# Patient Record
Sex: MALE | Race: WHITE | NOT HISPANIC OR LATINO | Employment: OTHER | URBAN - METROPOLITAN AREA
[De-identification: names, ages, dates, MRNs, and addresses within clinical notes are randomized per-mention and may not be internally consistent; named-entity substitution may affect disease eponyms.]

---

## 2017-12-14 ENCOUNTER — RECORDS - HEALTHEAST (OUTPATIENT)
Dept: LAB | Facility: CLINIC | Age: 57
End: 2017-12-14

## 2017-12-15 LAB — HBA1C MFR BLD: 5.5 % (ref 4.2–6.1)

## 2018-07-09 ENCOUNTER — RECORDS - HEALTHEAST (OUTPATIENT)
Dept: ADMINISTRATIVE | Facility: OTHER | Age: 58
End: 2018-07-09

## 2021-09-02 ENCOUNTER — LAB REQUISITION (OUTPATIENT)
Dept: LAB | Facility: CLINIC | Age: 61
End: 2021-09-02
Payer: COMMERCIAL

## 2021-09-02 DIAGNOSIS — R73.09 OTHER ABNORMAL GLUCOSE: ICD-10-CM

## 2021-09-02 LAB
ALBUMIN SERPL-MCNC: 3.2 G/DL (ref 3.5–5)
ALP SERPL-CCNC: 69 U/L (ref 45–120)
ALT SERPL W P-5'-P-CCNC: 23 U/L (ref 0–45)
ANION GAP SERPL CALCULATED.3IONS-SCNC: 11 MMOL/L (ref 5–18)
AST SERPL W P-5'-P-CCNC: 16 U/L (ref 0–40)
BILIRUB SERPL-MCNC: 0.5 MG/DL (ref 0–1)
BUN SERPL-MCNC: 21 MG/DL (ref 8–22)
CALCIUM SERPL-MCNC: 9.1 MG/DL (ref 8.5–10.5)
CHLORIDE BLD-SCNC: 106 MMOL/L (ref 98–107)
CHOLEST SERPL-MCNC: 167 MG/DL
CO2 SERPL-SCNC: 28 MMOL/L (ref 22–31)
CREAT SERPL-MCNC: 0.79 MG/DL (ref 0.7–1.3)
GFR SERPL CREATININE-BSD FRML MDRD: >90 ML/MIN/1.73M2
GLUCOSE BLD-MCNC: 122 MG/DL (ref 70–125)
HDLC SERPL-MCNC: 36 MG/DL
LDLC SERPL CALC-MCNC: 114 MG/DL
POTASSIUM BLD-SCNC: 4.1 MMOL/L (ref 3.5–5)
PROT SERPL-MCNC: 6.7 G/DL (ref 6–8)
SODIUM SERPL-SCNC: 145 MMOL/L (ref 136–145)
TRIGL SERPL-MCNC: 86 MG/DL

## 2021-09-02 PROCEDURE — 80061 LIPID PANEL: CPT | Mod: ORL | Performed by: FAMILY MEDICINE

## 2021-09-02 PROCEDURE — 80053 COMPREHEN METABOLIC PANEL: CPT | Mod: ORL | Performed by: FAMILY MEDICINE

## 2021-09-29 ENCOUNTER — LAB REQUISITION (OUTPATIENT)
Dept: LAB | Facility: CLINIC | Age: 61
End: 2021-09-29
Payer: COMMERCIAL

## 2021-09-29 DIAGNOSIS — Z03.818 ENCOUNTER FOR OBSERVATION FOR SUSPECTED EXPOSURE TO OTHER BIOLOGICAL AGENTS RULED OUT: ICD-10-CM

## 2021-09-29 PROCEDURE — U0003 INFECTIOUS AGENT DETECTION BY NUCLEIC ACID (DNA OR RNA); SEVERE ACUTE RESPIRATORY SYNDROME CORONAVIRUS 2 (SARS-COV-2) (CORONAVIRUS DISEASE [COVID-19]), AMPLIFIED PROBE TECHNIQUE, MAKING USE OF HIGH THROUGHPUT TECHNOLOGIES AS DESCRIBED BY CMS-2020-01-R: HCPCS | Mod: ORL | Performed by: FAMILY MEDICINE

## 2021-09-30 LAB — SARS-COV-2 RNA RESP QL NAA+PROBE: NEGATIVE

## 2021-12-20 ENCOUNTER — PATIENT OUTREACH (OUTPATIENT)
Dept: CARE COORDINATION | Facility: CLINIC | Age: 61
End: 2021-12-20
Payer: COMMERCIAL

## 2021-12-20 NOTE — LETTER
[facility]   Patient Centered Plan of Care  About Me:        Patient Name:  Stephan Montanez    YOB: 1960  Age:         61 year old   [place] MRN:    [id number] Telephone Information:  Home Phone [phone]   Mobile Not on file.       Address:  [address] [place] [address] Email address:  No e-mail address on record      Emergency Contact(s)    Name Relationship Lgl Grd Work Phone Home Phone Mobile Phone   1. JOSÉ PAULA Significant ot*    [phone]           Primary language:  English     needed? No   [place] [employer]:  [phone] op. 1  Other communication barriers:None    Preferred Method of Communication:     Current living arrangement: I live in a private home with family    Mobility Status/ Medical Equipment: Dependent/Assisted by Another        Health Maintenance  Health Maintenance Reviewed: Up to date      My Access Plan  Medical Emergency 911   Primary Clinic Line [facility] - [phone]   24 Hour Appointment Line [phone] or  [phone]) (toll-free)   24 Hour Nurse Line [phone] (toll-free)   Preferred Urgent Care Other     Preferred Hospital Other     Preferred Pharmacy No Pharmacies Listed   Behavioral Health Crisis Line The National Suicide Prevention Lifeline at 1-939.203.7575 or 911             My Care Team Members  Patient Care Team       Relationship Specialty Notifications Start End    Juan Martinez MD PCP - General Family Medicine  1/3/22     Phone: [phone] Fax: [phone]         [facility] [address] [place] [address]    Yohana Michelle LSW Lead Care Coordinator  Admissions 1/3/22             My Care Plans  Self Management and Treatment Plan  Goals and (Comments)  Goals        General     1. Assistive Service (pt-stated)      Notes - Note created  1/3/2022 10:25 AM by Yohana Michelle LSW     Goal Statement: I will apply for assistance to help with costs of  medical device assistance to be in my home to assist in meeting my needs within the next 6 months.  Date Goal set: 12/20/2021  Barriers: eligibility and availability  Strengths: strong advocate for self, family support  Date to Achieve By: 6/20/2022  Patient expressed understanding of goal: yes  Action steps to achieve this goal:  1. [place] to [place] if I am eligible for assistance in getting medical equipment in my house to assist in meeting my needs.   2. I will apply for financial assistance to see if I am eligible to get assistance for medical equipment to better meet my needs.   3. I will contact the  SW with any questions or concerns.               Action Plans on File:                       Advance Care Plans/Directives Type:   No data recorded    My Medical and Care Information  Problem List   There is no problem list on file for this patient.     Current Medications and Allergies:  See printed Medication Report.    Care Coordination Start Date: 12/20/2021   Frequency of Care Coordination: monthly     Form Last Updated: 01/03/2022

## 2021-12-20 NOTE — LETTER
[facility] CARE COORDINATION    January 3, 2022    Stephan Montanez  [address] [place] [address]      Dear Stephan,    I am a clinic care coordinator who works with Juan Martinez MD at [facility]. I wanted to thank you for spending the time to talk with me.  Below is a description of clinic care coordination and how I can further assist you.      The clinic care coordination team is made up of a registered nurse,  and community health worker who understand the health care system. The goal of clinic care coordination is to help you manage your health and improve access to the health care system in the most efficient manner. The team can assist you in meeting your health care goals by providing education, coordinating services, strengthening the communication among your providers and supporting you with any resource needs.    Please feel free to contact me at [phone] with any questions or concerns. We are focused on providing you with the highest-quality healthcare experience possible and that all starts with you.     Sincerely,     Yohana Michelle [facility]  Clinic Care Coordinator  [facility]     Enclosed: I have enclosed a copy of the Patient Centered Plan of Care. This has helpful information and goals that we have talked about. Please keep this in an easy to access place to use as needed.    Resources:

## 2022-01-03 ASSESSMENT — ACTIVITIES OF DAILY LIVING (ADL): DEPENDENT_IADLS:: TRANSPORTATION

## 2022-01-03 NOTE — PROGRESS NOTES
Clinic Care Coordination Contact    Clinic Care Coordination Contact  OUTREACH    Referral Information:  Referral Source: Care Team    Primary Diagnosis: Psychosocial    Chief Complaint   Patient presents with     Clinic Care Coordination - Initial        Universal Utilization:   Clinic Utilization: [facility]  Difficulty keeping appointments:: Yes  Compliance Concerns: No  No-Show Concerns: Yes  No PCP office visit in Past Year: No  Utilization    Hospital Admissions  0             ED Visits  0             No Show Count (past year)  1                Current as of: 12/30/2021  3:02 AM              Clinical Concerns:  Current Medical Concerns: BEN ENGLE met with the pt in clinic to discuss if the pt would like any assistance with resources. Pt was hesitant at first with the role of the SW and if they were [place] into the county role, concerns over being able to stay in his home or not. BEN ENGLE explained the role of the CC KADIE and the assistance being offered. Pt and the pt's significant other/caregiver stated it would be nice to have assistance with in home supports for the shower/bathroom and around the house to make it easier for the pt to get around and have the pt's needs met more fully.    Current Behavioral Concerns: n/a    Education Provided to patient: BEN ENGLE met and spoke with patient; introduced self, discussed role of Care Coordination, and explained reason for call.   Pain  Pain (GOAL):: No  Health Maintenance Reviewed: Up to date  Clinical Pathway: None    Medication Management:  Medication review status: did not discuss    Functional Status:  Dependent ADLs:: Wheelchair-with assist  Dependent IADLs:: Transportation  Bed or wheelchair confined:: No  Mobility Status: Dependent/Assisted by Another  Fallen 2 or more times in the past year?: No  Any fall with injury in the past year?: No    Living Situation:  Current living arrangement:: I live in a private home with family  Type of residence::  Private home - stairs    Lifestyle & Psychosocial Needs:    Social Determinants of Health     Tobacco Use: Not on file   Alcohol Use: Not on file   Financial Resource Strain: Not on file   Food Insecurity: Not on file   Transportation Needs: Not on file   Physical Activity: Not on file   Stress: Not on file   Social Connections: Not on file   Intimate Partner Violence: Not on file   Depression: Not on file   Housing Stability: Not on file     Diet:: Regular  Inadequate nutrition (GOAL):: Yes  Tube Feeding: No  Inadequate activity/exercise (GOAL):: Yes  Significant changes in sleep pattern (GOAL): No  Transportation means:: Accessible car,Family     Confucianist or spiritual beliefs that impact treatment:: No  Mental health DX:: No  Mental health management concern (GOAL):: No  Chemical Dependency Status: No Current Concerns  Informal Support system:: Significant other,Partner          Resources and Interventions:  Current Resources:      Community Resources: None  Supplies used at home:: None  Equipment Currently Used at Home: wheelchair, manual  Employment Status: self-employed         Advance Care Plan/Directive  Advanced Care Plans/Directives on file:: No  Advanced Care Plan/Directive Status: Not Applicable    Referrals Placed: Community Resources     Goals:   Goals        General     1. Assistive Service (pt-stated)      Notes - Note created  1/3/2022 10:25 AM by Yohana Michelle LSW     Goal Statement: I will apply for assistance to help with costs of medical device assistance to be in my home to assist in meeting my needs within the next 6 months.  Date Goal set: 12/20/2021  Barriers: eligibility and availability  Strengths: strong advocate for self, family support  Date to Achieve By: 6/20/2022  Patient expressed understanding of goal: yes  Action steps to achieve this goal:  1. [place] to [place] if I am eligible for assistance in getting medical equipment in my house to assist in  meeting my needs.   2. I will apply for financial assistance to see if I am eligible to get assistance for medical equipment to better meet my needs.   3. I will contact the CC SW with any questions or concerns.              Patient/Caregiver understanding: Patient verbalized understanding, engaged in AIDET communication during patient encounter.    Outreach Frequency: monthly      Plan: BEN ENGLE gave the pt and his significant other resources when the pt was in clinic. CC KADIE will mail out additional resources and information to the pt. Pt to contact the CC KADIE with any questions or concerns. BEN ENGLE will outreach to the pt in 1 month to follow up.     [facility]  Clinic Care Coordinator  [facility]   [phone]

## 2022-02-11 ENCOUNTER — PATIENT OUTREACH (OUTPATIENT)
Dept: CARE COORDINATION | Facility: CLINIC | Age: 62
End: 2022-02-11

## 2022-03-09 NOTE — PROGRESS NOTES
Clinic Care Coordination Contact    Follow Up Progress Note      Assessment: CC SW spoke with pt. Pt hadn't followed up on resources yet but was looking at them. Pt did follow up with PCP on swimming lessons and therapy for himself. Pt was very interested in this and had a therapy session set up for 2 weeks out.     Care Gaps:    Health Maintenance Due   Topic Date Due     PREVENTIVE CARE VISIT  Never done     ADVANCE CARE PLANNING  Never done     COLORECTAL CANCER SCREENING  Never done     HIV SCREENING  Never done     HEPATITIS C SCREENING  Never done     ZOSTER IMMUNIZATION (1 of 2) Never done     INFLUENZA VACCINE (1) Never done     COVID-19 Vaccine (2 - Pfizer 3-dose series) 09/23/2021     PHQ-2 (once per calendar year)  Never done       Currently there are no Care Gaps.    Goals addressed this encounter:   Goals Addressed                    This Visit's Progress       1. Assistive Service (pt-stated)   10%      Goal Statement: I will apply for assistance to help with costs of medical device assistance to be in my home to assist in meeting my needs within the next 6 months.  Date Goal set: 12/20/2021  Barriers: eligibility and availability  Strengths: strong advocate for self, family support  Date to Achieve By: 6/20/2022  Patient expressed understanding of goal: yes  Action steps to achieve this goal:  1. [place] to [place] if I am eligible for assistance in getting medical equipment in my house to assist in meeting my needs.   2. I will apply for financial assistance to see if I am eligible to get assistance for medical equipment to better meet my needs.   3. I will contact the CC SW with any questions or concerns.              Intervention/Education provided during outreach: Patient verbalized understanding, engaged in AIDET communication during patient encounter.       Outreach Frequency: monthly    Plan:   Pt to follow up with resources.   Care Coordinator will follow up in 1  month.    MARCIA Jackson  Social Work Care Coordinator - [facility]  Care Coordination  [email or web address]  Cell Phone: [phone]  Gender pronouns: she/her  Employed by [employer]

## 2022-04-21 ENCOUNTER — PATIENT OUTREACH (OUTPATIENT)
Dept: CARE COORDINATION | Facility: CLINIC | Age: 62
End: 2022-04-21

## 2022-05-26 NOTE — PROGRESS NOTES
Clinic Care Coordination Contact    Follow Up Progress Note      Assessment: CC KADIE spoke with the pt to follow up. Pt stated that his swimming lessons and his therapy are going really good for him. Pt stated that he hasn't applied for assistance, but other then that things are well. Pt stated that he has started on some gummies, and that is going really well.     Care Gaps:    Health Maintenance Due   Topic Date Due     PREVENTIVE CARE VISIT  Never done     ADVANCE CARE PLANNING  Never done     COLORECTAL CANCER SCREENING  Never done     HIV SCREENING  Never done     HEPATITIS C SCREENING  Never done     ZOSTER IMMUNIZATION (1 of 2) Never done     COVID-19 Vaccine (2 - Pfizer series) 09/23/2021     PHQ-2 (once per calendar year)  Never done       Currently there are no Care Gaps.    Goals addressed this encounter:    Goals Addressed                    This Visit's Progress       1. Assistive Service (pt-stated)   20%      Goal Statement: I will apply for assistance to help with costs of medical device assistance to be in my home to assist in meeting my needs within the next 6 months.  Date Goal set: 12/20/2021  Barriers: eligibility and availability  Strengths: strong advocate for self, family support  Date to Achieve By: 6/20/2022  Patient expressed understanding of goal: yes  Action steps to achieve this goal:  1. [place] if I am eligible for assistance in getting medical equipment in my house to assist in meeting my needs.   2. I will apply for financial assistance to see if I am eligible to get assistance for medical equipment to better meet my needs.   3. I will contact the CC KADIE with any questions or concerns.                Intervention/Education provided during outreach: Patient verbalized understanding, engaged in AIDET communication during patient encounter.     Outreach Frequency: 2 months      Plan:   BEN ENGLE to follow up in 2 months.     MARCIA Jackson  Social  Work Care Coordinator - [facility]  Care Coordination  [email or web address]  Cell Phone: [phone]  Gender pronouns: she/her  Employed by [employer]

## 2022-07-06 ENCOUNTER — PATIENT OUTREACH (OUTPATIENT)
Dept: CARE COORDINATION | Facility: CLINIC | Age: 62
End: 2022-07-06

## 2022-07-06 NOTE — PROGRESS NOTES
Clinic Care Coordination Contact    Follow Up Progress Note      Assessment: KADIE CC called and outreached to Stephan, he states that he is doing okay. He hurt his shoulder yesterday by putting up a pool in the yard. He is trying to give his arm/shoulder a break. KADIE CC inquired if he needs anything regarding any social work needs and if [place]. He states that he has not yet done so and still is interested in completing that for equipment. He asked that KADIE CC call him in 2 weeks to check in rather than a month.    Care Gaps:    Health Maintenance Due   Topic Date Due     PREVENTIVE CARE VISIT  Never done     ADVANCE CARE PLANNING  Never done     COLORECTAL CANCER SCREENING  Never done     HIV SCREENING  Never done     HEPATITIS C SCREENING  Never done     ZOSTER IMMUNIZATION (1 of 2) Never done     COVID-19 Vaccine (2 - Pfizer series) 09/23/2021     PHQ-2 (once per calendar year)  Never done     Goals addressed this encounter:    Goals Addressed                    This Visit's Progress       1. Assistive Service (pt-stated)   0%      Goal Statement: I will apply for assistance to help with costs of medical device assistance to be in my home to assist in meeting my needs within the next 6 months.  Date Goal set: 12/20/2021  Barriers: eligibility and availability  Strengths: strong advocate for self, family support  Date to Achieve By: 6/20/2022  Patient expressed understanding of goal: yes  Action steps to achieve this goal:  1. [place] if I am eligible for assistance in getting medical equipment in my house to assist in meeting my needs.   2. I will apply for financial assistance to see if I am eligible to get assistance for medical equipment to better meet my needs.   3. I will contact the CC SW with any questions or concerns.                Intervention/Education provided during outreach: KADIE CONTRERAS role     Outreach Frequency: 2 months      Plan:  Care Coordinator will follow up in 2 weeks per request of client.    MARCIA Mendez for MARCIA Jackson  , Care Coordination  [facility]  [facility]  [facility]  [phone]

## 2022-08-29 ENCOUNTER — PATIENT OUTREACH (OUTPATIENT)
Dept: CARE COORDINATION | Facility: CLINIC | Age: 62
End: 2022-08-29

## 2022-08-29 NOTE — PROGRESS NOTES
Clinic Care Coordination Contact  Care Team Conversations    Clinic Care Coordination - Chart Review Only    Situation/Background: Patient chart reviewed by care coordinator related to [employer] conversion.    Assessment: Patient continues to be followed by Clinic Care Coordination.    Plan: Patient's chart updated to align with [employer] program for ongoing patient management.    MARCIA Jackson  Social Work Care Coordinator - [facility]  Care Coordination  [email or web address]  Cell Phone: [phone]  Gender pronouns: she/her  Employed by [employer]

## 2022-10-11 ENCOUNTER — PATIENT OUTREACH (OUTPATIENT)
Dept: CARE COORDINATION | Facility: CLINIC | Age: 62
End: 2022-10-11

## 2022-10-11 NOTE — PROGRESS NOTES
Clinic Care Coordination Contact    Follow Up Progress Note      Assessment: BEN ENGLE followed up with the pt for his monthly outreach. Pt stated that things were going. Pt has an appointment this afternoon with his provider for ongoing knee pain. Pt stated that he has not been doing PT as he isn't sure about that at this time. BEN ENGLE brought up a previous conversation the provider had with the pt about wanting to get into the gym, and the provider had suggested going through PT, but the pt stated he was still unsure about that. Pt stated that things are going well. BEN SW did follow up on financial assistance and assistive devices in the home, but the pt stated things were okay right now. Pt did state he would prefer to have 2 week check ins. BEN KADIE agreed to this and scheduled a follow up for 2 weeks out.     Care Gaps:    Health Maintenance Due   Topic Date Due     YEARLY PREVENTIVE VISIT  Never done     ADVANCE CARE PLANNING  Never done     HEPATITIS B IMMUNIZATION (1 of 3 - 3-dose series) Never done     COLORECTAL CANCER SCREENING  Never done     HIV SCREENING  Never done     HEPATITIS C SCREENING  Never done     ZOSTER IMMUNIZATION (1 of 2) Never done     COVID-19 Vaccine (2 - Pfizer series) 09/23/2021     PHQ-2 (once per calendar year)  Never done     INFLUENZA VACCINE (1) Never done       Currently there are no Care Gaps.    Care Plans  Care Plan: Help At Home SW Only     Problem: Insufficient In-home support     Goal: Establish adequate in-home support (including equipment)     Start Date: 10/11/2022 Expected End Date: 4/11/2023    Note:     Goal Statement: I will apply for assistance to help with costs of medical device assistance to be in my home to assist in meeting my needs within the next 6 months.  Barriers: eligibility and availability  Strengths: strong advocate for self, family support  Patient expressed understanding of goal: yes  Action steps to achieve this goal:  1. [place] to see if I am eligible for assistance in getting medical equipment in my house to assist in meeting my needs.   2. I will apply for financial assistance to see if I am eligible to get assistance for medical equipment to better meet my needs.   3. I will contact the CC SW with any questions or concerns.                          Intervention/Education provided during outreach: Patient verbalized understanding, engaged in AIDET communication during patient encounter.       Outreach Frequency: 2 weeks      Plan:   Pt to attend his appointment today for his ongoing knee pain. Pt to contact the CC SW with any questions or concerns.   Care Coordinator will follow up in 2 weeks.     Yohana Michelle [facility]  Social Work Care Coordinator - [facility]  Care Coordination  [email or web address]  Cell Phone: [phone]  Gender pronouns: she/her  Employed by [employer]

## 2023-01-26 ENCOUNTER — PATIENT OUTREACH (OUTPATIENT)
Dept: CARE COORDINATION | Facility: CLINIC | Age: 63
End: 2023-01-26
Payer: COMMERCIAL

## 2023-01-26 NOTE — PROGRESS NOTES
Clinic Care Coordination Contact  Care Team Conversations    CC KADIE followed up with the pt and checked in. PT stated that he was doing well and things were good at home. CC KADIE   Stated that due to prior conversations of pt unsure of supports and not sure if they were wanting them, CC KADIE was closing out, but if he had any needs come up or he decided that he wanted some help with resources and supports to let Michelle, his PCP and or myself know and I would be glad to assist him. Pt was happy and stated he would remember that and give me a call if things came up.     Yohana Michelle, [facility]  Social Work Care Coordinator - [facility]  Care Coordination  [email or web address]  Cell Phone: [phone]  Gender pronouns: she/her  Employed by [employer]

## 2023-02-06 ENCOUNTER — VIRTUAL VISIT (OUTPATIENT)
Dept: PHARMACY | Facility: PHYSICIAN GROUP | Age: 63
End: 2023-02-06
Payer: COMMERCIAL

## 2023-02-06 DIAGNOSIS — G47.00 INSOMNIA, UNSPECIFIED TYPE: ICD-10-CM

## 2023-02-06 DIAGNOSIS — R06.02 SHORTNESS OF BREATH: ICD-10-CM

## 2023-02-06 DIAGNOSIS — G89.29 OTHER CHRONIC PAIN: Primary | ICD-10-CM

## 2023-02-06 DIAGNOSIS — R05.9 COUGH, UNSPECIFIED TYPE: ICD-10-CM

## 2023-02-06 DIAGNOSIS — I10 BENIGN ESSENTIAL HYPERTENSION: ICD-10-CM

## 2023-02-06 DIAGNOSIS — F41.9 ANXIETY: ICD-10-CM

## 2023-02-06 DIAGNOSIS — R60.9 EDEMA, UNSPECIFIED TYPE: ICD-10-CM

## 2023-02-06 DIAGNOSIS — I48.20 CHRONIC ATRIAL FIBRILLATION (H): ICD-10-CM

## 2023-02-06 DIAGNOSIS — R09.89 CHEST CONGESTION: ICD-10-CM

## 2023-02-06 PROCEDURE — 99605 MTMS BY PHARM NP 15 MIN: CPT | Performed by: PHARMACIST

## 2023-02-06 PROCEDURE — 99607 MTMS BY PHARM ADDL 15 MIN: CPT | Performed by: PHARMACIST

## 2023-02-06 RX ORDER — OXYCODONE AND ACETAMINOPHEN 10; 325 MG/1; MG/1
1 TABLET ORAL 3 TIMES DAILY PRN
COMMUNITY

## 2023-02-06 RX ORDER — TRAZODONE HYDROCHLORIDE 100 MG/1
100-200 TABLET ORAL AT BEDTIME
COMMUNITY

## 2023-02-06 RX ORDER — METOPROLOL SUCCINATE 200 MG/1
200 TABLET, EXTENDED RELEASE ORAL DAILY
COMMUNITY

## 2023-02-06 NOTE — PROGRESS NOTES
Medication Therapy Management (MTM) Encounter    ASSESSMENT:                            Medication Adherence/Access: No issues identified    Chronic pain: Patient's pain is not well controlled on only a fast acting opioid medication.  Would be beneficial to try switching to extended release formulation.  Patient asked to try one more formulary alternatives such as Belbuca or fentanyl patch.  Would prefer to try Belbuca due to safety concerns with fentanyl patch.  Discussed transitioning to Belbuca with clinical pharmacist at [facility] pain clinic, and he suggested considering a gradual cross taper.  Discussed this plan with Dr. Martinez and we agreed this may be the best tolerated option.  Small doses of sublingual buprenorphine, typically 0.5 mg or less (but even up to 1 mg), do not precipitate withdrawal.  Could start with lower dose reduction of oxycodone, reduce by 15 MME and add in equivalent or slightly lower dose of buprenorphine.  Then if tolerating and helping with pain could continue decreasing oxycodone by 15 MME (1 oxycodone tablet) once monthly and increasing dose of buprenorphine by similar MME.  Patient may also benefit from trying pregabalin or gabapentin, may help with sciatica component.  Could consider SNRIs that also help with pain, but will defer these options for now.  Only want to try one medication change and would like to stabilize his pain regimen on his opioid medication first.    Buprenorphine MME Equivalents:       MME Per Day   Buprenorphine       Patch   Buprenorphine      Film    9  5 mcg/hr  150 mcg BID    18  10 mcg/hr  300 mcg BID    27  15 mcg/hr  450 mcg BID    36  20 mcg/hr  600 mcg BID    45  --  750 mcg BID    54  --  900 mcg BID     Patrice GUTIERREZ, Eliana MARIN, Erin J, Sukhi A. Buprenorphine Microdosing Cross Tapers: A Time for Change. Int J Environ Res Public Health. 2022 Dec 8;19(69):52641. doi: 10.3390/bvigmy025508371. PMID: 64201824; PMCID:  AOG9939611.        Possible plan:  1.  Decrease Oxycodone-acetaminophen  mg to 1 1/2 tablets in the morning, 2 tablets midday, and 1 1/2 tablets at bedtime. Start Buprenorphine 150 mcg in the morning and at bedtime  -Dose reduction of 15 MME of oxycodone, starting 9 MME of buprenorphine    Insomnia: primarily affected by pain.  Better pain control will likely result in better quality sleep    Anxiety:  Worse as pain hasn't been well controlled.    Afib/Hypertension/Edema: Stable. Patient is meeting blood pressure goal of < 140/90mmHg.  Edema is stable.  INR has been in goal range of 2-3.    Cough/Congestion: Stable.    Shortness of breath:  Stable.    PLAN:                            Possible plan:  1.  Decrease Oxycodone-acetaminophen  mg to 1 1/2 tablets in the morning, 2 tablets midday, and 1 1/2 tablets at bedtime. Start Buprenorphine 150 mcg in the morning and at bedtime    Follow-up: Return in about 15 days (around 2/21/2023) for MTM Follow Up.    SUBJECTIVE/OBJECTIVE:                          Stephan Montanez is a 62 year old male called for an initial visit. He was referred to me from Dr. Martinez.      Reason for visit: Initial medication review.  Referral to discuss possibly switching to Belbuca for chronic pain.    Allergies/ADRs: Reviewed in chart  Past Medical History: Reviewed in chart  Tobacco: He reports that he has quit smoking. His smoking use included cigarettes. He has never used smokeless tobacco.  Alcohol: none    Medication Adherence/Access: no issues reported    Chronic pain:  Current medications include: Oxycodone  mg 2 tablets three times daily for pain. Is now having a lot of increase in pain, not as mobile due to increase in pain, sciatica pain is worse.  He only gets pain relief from oxycodone for about 3 hours, then he is in severe pain until his next dose.  Tried Morphine ER tablets, didn't work as well for pain.  His insurance requires that he tried two covered  medications before a PA for OxyContin would be considered.  He thinks he tried gabapentin in the past, unsure though.  I didn't see it in his medication history.  He was on an ER OxyContin, then they switched to Morphine, and now just on oxycodone.  The pain isn't as well controlled, they seem to wear off too quickly.  He is up a lot of night with pain  Per a fax from the pharmacy patients insurance prefers fentanyl, morphine sulfate (had tried this) and Belbuca.  MME 90    Previous medications:   MS Contin - on this for 2 months, wasn't effective in controlling his pain.  He also has constant nausea and other GI side effects  Morphine sulfate - on this for 2 months, and it was not effective in controlling his pain  Oxycodone-Acetaminophen - not effective in controlling his pain.  Needs an extended release medication that has a longer duration of action.  Patient only gets pain relief for about 3 hours with oxycodone-acetaminophen.    Insomnia: Current medications include: trazodone 100-200 mg at bedtime. He hasn't been sleeping well but primarily due to pain.  With the changes in his pain medications he wakes up a lot with pain. Patient reports trouble staying asleep and trouble falling asleep.     Anxiety:  Was using hydroxyzine 50 mg four times daily as needed for anxiety and sleep, but he didn't tolerate it.  It made his anxiety a lot worse and caused panic attacks . Has been more anxious with the increased pain.  Patient was worried I'm trying to take his pain meds away from him.  He's worried about trying a new medication, and if it doesn't work he's scared to be without pain meds.  Previous meds tried:    Escitalopram  Hydroxyzine - made anxiety worse.    Afib/Hypertension/Edema: Patient is currently taking warfarin daily per INR for anticoagulation. Patient reports no current concerns of bruising or bleeding. Patient does not have a history of GI bleed.   Patient is also taking Metoprolol  mg daily,  Torsemide 20 mg twice daily as needed (doesn't use every day, uses a few days a month). Also reports that he is taking lisinopril still although this isn't on his medication list anymore.  Patient reports no current medication side effects.   Patient does not self-monitor blood pressure.    Cough/Congestion: Taking Mucinex ER 1200 mg twice daily as needed for cough/congestion.  Reports not using daily, but takes on occasion when he has a cold.    Shortness of breath:  Uses a ventolin inhaler as needed for shortness of breath.  Typically only needs this when he has a cold and the cough is lingering.  Not using often.  Gets short of breath sometimes with strenuous activity    Obesity:  He is going to be starting a [place] Fat [employer] diet,    he isn't sure of what all is entailed but will be talking with someone from there today.  Will be working with a health  or dietician it sounds like.    ----------------      I spent 60 minutes with this patient today. All changes were made via verbal approval with Juan Martinez MD. A copy of the visit note was provided to the patient's provider(s).    A summary of these recommendations was mailed to the patient.    Gladys Garza PharmD  Medication Therapy Management Pharmacist  Pager: [phone]      Telemedicine Visit Details  Type of service:  Telephone visit  Start Time: 2 PM  End Time: 3 PM     Medication Therapy Recommendations  No medication therapy recommendations to display

## 2023-02-12 RX ORDER — GUAIFENESIN 600 MG/1
1200 TABLET, EXTENDED RELEASE ORAL 2 TIMES DAILY
COMMUNITY

## 2023-02-12 RX ORDER — WARFARIN SODIUM 2.5 MG/1
TABLET ORAL
COMMUNITY

## 2023-02-12 RX ORDER — TORSEMIDE 20 MG/1
20 TABLET ORAL 2 TIMES DAILY
COMMUNITY

## 2023-02-12 RX ORDER — ALBUTEROL SULFATE 90 UG/1
2 AEROSOL, METERED RESPIRATORY (INHALATION) EVERY 6 HOURS PRN
COMMUNITY

## 2023-02-12 NOTE — PATIENT INSTRUCTIONS
"Recommendations from today's MTM visit:                                                    MTM (medication therapy management) is a service provided by a clinical pharmacist designed to help you get the most of out of your medicines.   Today we reviewed what your medicines are for, how to know if they are working, that your medicines are safe and how to make your medicine regimen as easy as possible.      We will send in the new prescription for Belbuca films, but wait until 2/20 to start this medication.    Plan For Monday 2/20:  1. Decrease Oxycodone-acetaminophen  mg to 1 1/2 tablets in the morning, 2 tablets midday, and 1 1/2 tablets at bedtime.   Start Buprenorphine buccal film 150 mcg in the morning and at bedtime.    BELBUCA works by sticking to the inside of your cheek and then dissolving completely--usually within 30 minutes. Leave BELBUCA on the inside of your cheek until fully dissolved. If you chew or swallow the buccal film, it may not be as effective.  Do not swallow the film    Follow-up: Return in about 15 days (around 2/21/2023) for MTM Follow Up.    It was great speaking with you today.  I value your experience and would be very thankful for your time in providing feedback in our clinic survey. In the next few days, you may receive an email or text message from [employer] with a link to a survey related to your  clinical pharmacist.\"     To schedule another MTM appointment, please call the clinic directly or you may call the MTM scheduling line at [phone] or toll-free at [phone].     My Clinical Pharmacist's contact information:                                                      Please feel free to contact me with any questions or concerns you have.      Gladys Garza, Marcela  Medication Therapy Management Pharmacist  Pager: [phone]     "

## 2023-02-21 ENCOUNTER — VIRTUAL VISIT (OUTPATIENT)
Dept: PHARMACY | Facility: PHYSICIAN GROUP | Age: 63
End: 2023-02-21
Payer: COMMERCIAL

## 2023-02-21 DIAGNOSIS — G89.29 OTHER CHRONIC PAIN: Primary | ICD-10-CM

## 2023-02-21 DIAGNOSIS — I10 BENIGN ESSENTIAL HYPERTENSION: ICD-10-CM

## 2023-02-21 DIAGNOSIS — R60.9 EDEMA, UNSPECIFIED TYPE: ICD-10-CM

## 2023-02-21 DIAGNOSIS — I48.20 CHRONIC ATRIAL FIBRILLATION (H): ICD-10-CM

## 2023-02-21 DIAGNOSIS — E66.9 OBESITY, UNSPECIFIED CLASSIFICATION, UNSPECIFIED OBESITY TYPE, UNSPECIFIED WHETHER SERIOUS COMORBIDITY PRESENT: ICD-10-CM

## 2023-02-21 PROCEDURE — 99607 MTMS BY PHARM ADDL 15 MIN: CPT | Performed by: PHARMACIST

## 2023-02-21 PROCEDURE — 99606 MTMS BY PHARM EST 15 MIN: CPT | Performed by: PHARMACIST

## 2023-02-21 NOTE — PROGRESS NOTES
Medication Therapy Management (MTM) Encounter    ASSESSMENT:                            Medication Adherence/Access: No issues identified     Chronic pain:  Plan still in place to try adding in a low-dose of buprenorphine film along with his oxycodone.  See initial plan from 2/6/2023 visit.  We are just waiting to get the approval on a prior authorization for Belbuca films.  Patient may also benefit from trying pregabalin or gabapentin, may help with sciatica component.  Discussed possible side effects of dizziness, drowsiness with patient.  We could consider starting pregabalin 25 mg at bedtime and monitor for side effects.  This may help with controlling his pain symptoms while sleeping.  If tolerating we could try to slowly increase the dose.  Will discuss with Dr. Martinez     Obesity:   Patient is seeing significant success already with the diet he is engaged in.  Encouraged patient to continue the great work.    Afib/Hypertension/Edema:  INR is in goal range with last check.  Likely had to increase warfarin doses due to diet changes related to his weight loss diet.    PLAN:                            1.  We will wait to start Belbuca until able to get the prior authorization approved.  2.  Recommend starting pregabalin 25 mg at bedtime    Follow-up: Return in about 2 weeks (around 3/7/2023) for MTM Follow Up.     Update 3/1:  The day after we spoke Belbuca was approved, so will hold off on trying pregabalin.    SUBJECTIVE/OBJECTIVE:                          Stephan Montanez is a 63 year old male called for a follow-up visit.  Today's visit is a follow-up MTM visit from 2/6/2023.  We were also joined today by Lani Worrell, MTM pharmacist who is here with me today training.     Reason for visit: Follow up on transition from oxycodone to Belbuca, doing a slow transition.    Allergies/ADRs: Reviewed in chart  Past Medical History: Reviewed in chart  Tobacco: He reports that he has quit smoking. His smoking use  included cigarettes. He has never used smokeless tobacco.  Alcohol: none     Medication Adherence/Access: no issues reported    Chronic pain:    Oxycodone-acetaminophen  mg 1 tablet every 4-6 hours, up to 6 tabs per day.    Was previously taking 2 tablets 3 times daily.  Was having significant issues with his pain because the oxycodone would only last for about 3 hours.  Patient feels that taking oxycodone every 4-6 hours is working better.  We would still like to transition to an extended release medication.  We are waiting on the PA for Belbuca, our PA team is a few weeks behind so unsure when he will be able to start Belbuca  Not as mobile due to increase in pain, sciatica pain is worse.  He only gets pain relief from oxycodone for about 3 hours, then he is in severe pain until his next dose.  Having mostly pain in his knees that bothers him.  Uses heating pad which does help with his knee and leg pain.  Patient reports having sciatica pain from hip that shoots down into his legs several times a day he did not have this previously when he was on OxyContin.  Patient thinks he has tried gabapentin, however I do not see this on his medication history.  MME: 90     Previous medications:   MS Contin - on this for 2 months, wasn't effective in controlling his pain.  He also has constant nausea and other GI side effects  Morphine sulfate - on this for 2 months, and it was not effective in controlling his pain  Oxycodone-Acetaminophen - not effective in controlling his pain.  Needs an extended release medication that has a longer duration of action.  Patient only gets pain relief for about 3 hours with oxycodone-acetaminophen.    Obesity:   Patient has better been working with [employer] last diet.  He is eating a lot more vegetables and fruits.  Reports eating significantly more green vegetables, spinach in his daily diet.  He has found this is working very well and is already lost quite a bit of weight even in  the first few weeks.  There was a lot of background noise on the call so I did not hear exactly how much he said he lost.    Afib/Hypertension/Edema: Patient is currently taking warfarin daily per INR for anticoagulation. Patient reports no current concerns of bruising or bleeding.  We discussed that his diet changes are likely what has contributed to his lower INR readings.  See below.  Patient checks weekly home INR.  patient does not have a history of GI bleed.   Patient is also taking Metoprolol  mg daily, Lisinopril 10 mg daily, Torsemide 20 mg twice daily as needed (doesn't use every day, uses a few days a month).  Patient reports no current medication side effects.   Patient does not self-monitor blood pressure.    ----------------      I spent 35 minutes with this patient today. I offer these suggestions for consideration by Dr. Maritnez. A copy of the visit note was provided to the patient's provider(s).    A summary of these recommendations was declined by the patient.  Will mail patient written instructions if Dr. Martinez agrees with starting pregabalin.  Otherwise we will be waiting until the [facility] is approved to change any medications    Gladys Garza, PharmD  Medication Therapy Management Pharmacist  Pager: [phone]      Telemedicine Visit Details  Type of service:  Telephone visit  Start Time: 3:10 PM  End Time: 3:45 PM     Medication Therapy Recommendations  Other chronic pain    Current Medication: oxyCODONE-acetaminophen (PERCOCET)  MG per tablet   Rationale: Synergistic therapy - Needs additional medication therapy - Indication   Recommendation: Start Medication   Status: Contact Provider - Awaiting Response   Note: Pregabalin

## 2023-03-02 NOTE — PATIENT INSTRUCTIONS
"Recommendations from today's MTM visit:                                                           Start taking Belbuca 150 mcg in the morning and at bedtime.  This is a buccal film, so you will place it on the inside of your cheek in your mouth and let it dissolve    When you start Belbuca- Decrease Oxycodone-acetaminophen  mg to 1 1/2 tablets in the morning, 2 tablets midday, and 1 1/2 tablets at bedtime.     Follow-up: Return in about 2 weeks (around 3/7/2023) for MTM Follow Up.    It was great speaking with you today.  I value your experience and would be very thankful for your time in providing feedback in our clinic survey. In the next few days, you may receive an email or text message from [employer] with a link to a survey related to your  clinical pharmacist.\"     To schedule another MTM appointment, please call the clinic directly or you may call the MTM scheduling line at [phone] or toll-free at [phone].     My Clinical Pharmacist's contact information:                                                      Please feel free to contact me with any questions or concerns you have.      Gladys Garza, PharmD  Medication Therapy Management Pharmacist  Pager: [phone]     "

## 2023-03-07 ENCOUNTER — VIRTUAL VISIT (OUTPATIENT)
Dept: PHARMACY | Facility: PHYSICIAN GROUP | Age: 63
End: 2023-03-07
Payer: COMMERCIAL

## 2023-03-07 DIAGNOSIS — G89.29 OTHER CHRONIC PAIN: Primary | ICD-10-CM

## 2023-03-07 DIAGNOSIS — E66.9 OBESITY, UNSPECIFIED CLASSIFICATION, UNSPECIFIED OBESITY TYPE, UNSPECIFIED WHETHER SERIOUS COMORBIDITY PRESENT: ICD-10-CM

## 2023-03-07 DIAGNOSIS — I10 BENIGN ESSENTIAL HYPERTENSION: ICD-10-CM

## 2023-03-07 PROCEDURE — 99606 MTMS BY PHARM EST 15 MIN: CPT | Performed by: PHARMACIST

## 2023-03-07 PROCEDURE — 99607 MTMS BY PHARM ADDL 15 MIN: CPT | Performed by: PHARMACIST

## 2023-03-07 NOTE — PROGRESS NOTES
Medication Therapy Management (MTM) Encounter    ASSESSMENT:                            Medication Adherence/Access: No issues identified     Chronic pain:  Plan in place to add low-dose of buprenorphine film along with his oxycodone.  See initial plan from 2/6/2023 visit.      Obesity:   Patient is seeing significant success already with the diet he is engaged in.  Encouraged patient to continue the great work.    Patient would benefit from an in-clinic appointment so we can check vitals and is over due for labs - declines scheduling visit in person for now.  Will ask again when his pain is more stable.  Will try getting him a blood pressure monitor as well so he can at least check home BP readings.    PLAN:                            1.  Plan to proceed with starting Belbuca as discussed before, reviewing dosing with patient and side effects to monitor for.    -Decrease Oxycodone-acetaminophen  mg to 1 & 1/2 tablets in the morning, 2 tablets midday, and 1 & 1/2 tablets at bedtime.   -Start Buprenorphine 150 mcg in the morning and at bedtime    2.  Will try sending in a home blood pressure monitor to pharmacy.    Follow-up: Return in about 6 days (around 3/13/2023) for MTM Follow Up.     SUBJECTIVE/OBJECTIVE:                          Stephan Monatnez is a 63 year old male called for a follow-up visit.  Today's visit is a follow-up MTM visit from 2/21/2023.  We were also joined today by Lani Worrell, MTM pharmacist who is here with me today training.   Reason for visit: Follow up to review starting Belbuca.    Allergies/ADRs: Reviewed in chart  Past Medical History: Reviewed in chart  Tobacco: He reports that he has quit smoking. His smoking use included cigarettes. He has never used smokeless tobacco.  Alcohol: none     Medication Adherence/Access: no issues reported    Chronic pain:    Oxycodone-acetaminophen  mg 1 tablet every 4-6 hours, up to 6 tabs per day.    Was previously taking 2 tablets 3 times  daily.  Was having significant issues with his pain because the oxycodone would only last for about 3 hours.  Patient feels that taking oxycodone every 4-6 hours is working better.  We would still like to transition to an extended release medication.   Belbuca (buprenorphine) 150mg film orally Q12 hours - PA approved since last visit; has not started taking yet  2 weeks of Belbuca given, if tolerating and working well we can fill the rest of the 2 weeks.  Then Dr. Martinez and I discussed doing monthly dose adjustments (reduce oxycodone small amount, increase Belbuca).   Not as mobile due to increase in pain, sciatica pain is worse.  He only gets pain relief from oxycodone for about 3 hours, then he is in severe pain until his next dose.  Having mostly pain in his knees that bothers him.  Uses heating pad which does help with his knee and leg pain.  Patient reports having sciatica pain from hip that shoots down into his legs several times a day he did not have this previously when he was on OxyContin.  Patient thinks he has tried gabapentin, however I do not see this on his medication history.  MME: 90     Previous medications:   MS Contin - on this for 2 months, wasn't effective in controlling his pain.  He also has constant nausea and other GI side effects  Morphine sulfate - on this for 2 months, and it was not effective in controlling his pain  Oxycodone-Acetaminophen - not effective in controlling his pain.  Needs an extended release medication that has a longer duration of action.  Patient only gets pain relief for about 3 hours with oxycodone-acetaminophen.    Obesity:   Patient has better been working with [place] fat loss diet.  He is eating a lot more vegetables and fruits, portion of meal, a.  Eats twice a day, no bread. They provide a meal plan, amino acid drops sublingual three times daily, and electrolytes.  Program costs $1600  Reports eating significantly more green vegetables, spinach in his daily  diet.  He has found this is working very well and is already lost quite a bit of weight even in the first few weeks.  There was a lot of background noise on the call so I did not hear exactly how much he said he lost. He is losing about 1 lb per day.  Lost abut 20-25 lbs already.      Discussed that the patient hasn't had a blood pressure reading or labs done in over a year. Patient declines scheduling in clinic appointment for now.  Said it's very hard for him to get into the clinic.  ----------------      I spent 25 minutes with this patient today. I offer these suggestions for consideration by Dr. Martinez. A copy of the visit note was provided to the patient's provider(s).    A summary of these recommendations was declined by the patient.      Gladys Garza, PharmD  Medication Therapy Management Pharmacist  Pager: [phone]      Telemedicine Visit Details  Type of service:  Telephone visit  Start Time: 3:00 pm  End Time: 3:25 pm     Medication Therapy Recommendations  Benign essential hypertension    Current Medication: metoprolol succinate ER (TOPROL XL) 200 MG 24 hr tablet   Rationale: Medication requires monitoring - Needs additional monitoring   Recommendation: Self-Monitoring   Status: Accepted per CPA         Other chronic pain    Current Medication: oxyCODONE-acetaminophen (PERCOCET)  MG per tablet   Rationale: Synergistic therapy - Needs additional medication therapy - Indication   Recommendation: Start Medication   Status: No Longer Relevant   Note: Pregabalin - DTP was approved, but then pt was able to get the belbuca so we didn't start pregabalin.  We were thinking of trying pregabalin if we had to wait a long time for Belbuca PA

## 2023-03-13 ENCOUNTER — VIRTUAL VISIT (OUTPATIENT)
Dept: PHARMACY | Facility: PHYSICIAN GROUP | Age: 63
End: 2023-03-13
Payer: COMMERCIAL

## 2023-03-13 DIAGNOSIS — G89.29 OTHER CHRONIC PAIN: Primary | ICD-10-CM

## 2023-03-13 PROCEDURE — 99606 MTMS BY PHARM EST 15 MIN: CPT | Performed by: PHARMACIST

## 2023-03-13 NOTE — PROGRESS NOTES
Medication Therapy Management (MTM) Encounter    ASSESSMENT:                            Medication Adherence/Access: No issues identified     Chronic pain:  Patient doing well on Belbuca.  Will continue current regimen of Belbuca twice daily and up to four tablets of oxycodone for breakthrough pain.  We can try adjusting dose of Belbuca monthly.  He will need the refill for the rest of the 14 days for Belbuca (we initially just filled for 14 days in case of adverse reaction).     Patient would benefit from an in-clinic appointment so we can check vitals and is over due for labs - declines scheduling visit in person for now.  Will ask again when his pain is more stable.  Will try getting him a blood pressure monitor as well so he can at least check home BP readings.    PLAN:                            1.  Continue current medications.  For next oxycodone refill we could reduce it to 120 tablets since four tablets per day is working and doing well on Belbuca.  Will send request for the 14 day refill of Belbuca, then going forward we could fill monthly supplies with dose changes.    2.  Will try sending in a home blood pressure monitor to pharmacy.    Follow-up: Return in 16 days (on 3/29/2023) for MTM Follow Up.   - Can assess if dose increase in Belbuca is appropriate  - helped patient schedule a follow up with Dr. Martinez.    SUBJECTIVE/OBJECTIVE:                          Stephan Montanez is a 63 year old male called for a follow-up visit.  Today's visit is a follow-up MTM visit from 2/21/2023.  We were also joined today by Lani Worrell, MTM pharmacist who is here with me today training.   Reason for visit: Follow up to review starting Belbuca.    Allergies/ADRs: Reviewed in chart  Past Medical History: Reviewed in chart  Tobacco: He reports that he has quit smoking. His smoking use included cigarettes. He has never used smokeless tobacco.  Alcohol: none     Medication Adherence/Access: no issues  reported    Chronic pain:    Oxycodone-acetaminophen  mg 1 tablet every 4-6 hours, up to 4  tabs per day.    Was previously taking 2 tablets 3 times daily.  Was having significant issues with his pain because the oxycodone would only last for about 3 hours.  Patient feels that taking oxycodone every 4-6 hours is working better.  We would still like to transition to an extended release medication.   Belbuca (buprenorphine) 150mg film orally twice daily - given 2 week supply   Since starting Belbuca patient reports sleeping longer at night, pain used to wake him up frequently.  Reports it's working as well as oxycodone for pain.  Hasn't seen worsening in his pain, hasn't gotten better other then not as much pain through the night.  Dr. Martinez and I discussed doing monthly dose adjustments (reduce oxycodone small amount, increase Belbuca).   Not as mobile due to increase in pain, sciatica pain is worse.  He only gets pain relief from oxycodone for about 3 hours, then he is in severe pain until his next dose.  Having mostly pain in his knees that bothers him.  Uses heating pad which does help with his knee and leg pain.  Patient reports having sciatica pain from hip that shoots down into his legs several times a day he did not have this previously when he was on OxyContin.  Patient thinks he has tried gabapentin, however I do not see this on his medication history.  MME: 90     Previous medications:   MS Contin - on this for 2 months, wasn't effective in controlling his pain.  He also has constant nausea and other GI side effects  Morphine sulfate - on this for 2 months, and it was not effective in controlling his pain  Oxycodone-Acetaminophen - not effective in controlling his pain.  Needs an extended release medication that has a longer duration of action.  Patient only gets pain relief for about 3 hours with oxycodone-acetaminophen.    ----------------      I spent 40 minutes with this patient today. I offer  these suggestions for consideration by Dr. Martinez. A copy of the visit note was provided to the patient's provider(s).    A summary of these recommendations was declined by the patient.      Gladys Garza PharmD  Medication Therapy Management Pharmacist  Pager: [phone]      Telemedicine Visit Details  Type of service:  Telephone visit  Start Time: 2:00 pm  End Time: 2:40 pm     Medication Therapy Recommendations  No medication therapy recommendations to display

## 2023-03-28 NOTE — PROGRESS NOTES
Medication Therapy Management (MTM) Encounter    ASSESSMENT:                            Medication Adherence/Access: No issues identified     Chronic pain:  Patient doing well on Belbuca.  Will continue current regimen of Belbuca twice daily and up to four tablets of oxycodone for breakthrough pain.  At next visit could consider increasing Belbuca to 300 mcg twice daily, and reducing oxycodone to 10 mg three times daily (would be 63 MME, slight dose decrease).  Has been stable on the Oxycodone 10 mg four tablets daily, for next refill could reduce to 120 tablets unless reducing daily dose.  MME conversion: 1 mg buprenorphine equivalent to 30 MME  Patient would benefit from an in-clinic appointment so we can check vitals and is over due for labs - declines scheduling visit in person for now.  Will ask again when his pain is more stable.  Will try getting him a blood pressure monitor as well so he can at least check home blood pressure readings.    Obesity: Discussed that he would be a good candidate for GLP1 agonist therapy such as Wegovy (insurance covers it).  Would need him to come in for in-clinic appointments however, he isn't sure about this.  Would not be a good candidate for phentermine given history of hypertension and atrial fibrillation.  Could consider naltrexone-bupropion generic versions, but again would want in-person follow ups.      PLAN:                            1.  Continue current medications.    Future considerations:  - Could consider increasing Belbuca to 300 mcg twice daily, and reducing Oxycodone to 10 mg three times daily as needed for breakthrough pain    Follow-up: Return in about 7 weeks (around 5/17/2023) for MTM Follow Up.     SUBJECTIVE/OBJECTIVE:                          Stephan Montanez is a 63 year old male called for a follow-up visit.  Today's visit is a follow-up MTM visit from 3/13/2023.  We were also joined today by Lani Worrell, MTM pharmacist who is here with me today  training.     Reason for visit: Follow up to review starting Belbuca.    Allergies/ADRs: Reviewed in chart  Past Medical History: Reviewed in chart  Tobacco: He reports that he has quit smoking. His smoking use included cigarettes. He has never used smokeless tobacco.  Alcohol: none     Medication Adherence/Access: no issues reported    Chronic pain:    Oxycodone-acetaminophen  mg 1 tablet every 4-6 hours, up to 4 tabs per day.    Was previously taking 2 tablets 3 times daily.  Feels his pain is doing OK, no increase in pain with the lower oxycodone dose  Belbuca (buprenorphine) 150 mg film orally twice daily - given 2 week supply   Since starting Belbuca patient reports sleeping longer at night, pain used to wake him up frequently.  Reports it's working as well as oxycodone for pain.  Hasn't seen worsening in his pain, hasn't gotten better other then not as much pain through the night.  Patient feels a little 'Off, jittery', not sure he feels overall on the Belbuca.  Doesn't think it's a bad feeling, doesn't feel 'high'.  We will monitor.  Dr. Martinez and I discussed doing monthly dose adjustments (reduce oxycodone small amount, increase Belbuca).   Not as mobile due to increase in pain, sciatica pain is worse.    Having mostly pain in his knees that bothers him.  Uses heating pad which does help with his knee and leg pain.  Patient reports having sciatica pain from hip that shoots down into his legs several times a day he did not have this previously when he was on OxyContin.  Patient thinks he has tried gabapentin, however I do not see this on his medication history.  MME: 69 (Belbuca plus oxycodone)     Previous medications:   MS Contin - on this for 2 months, wasn't effective in controlling his pain.  He also has constant nausea and other GI side effects  Morphine sulfate - on this for 2 months, and it was not effective in controlling his pain  Oxycodone-Acetaminophen - not effective in controlling his  pain.  Needs an extended release medication that has a longer duration of action.  Patient only gets pain relief for about 3 hours with oxycodone-acetaminophen.    Obesity:   Patient has better been working with [place] fat loss diet.  He is eating a lot more vegetables and fruits, portion of meal, a.  Eats twice a day, no bread. They provide a meal plan, amino acid drops sublingual three times daily, and electrolytes.  Program costs $1600  Reports eating significantly more green vegetables, spinach in his daily diet.  He has found this is working very well and is already lost quite a bit of weight even in the first few weeks.  There was a lot of background noise on the call so I did not hear exactly how much he said he lost. He is losing about 1 lb per day.  Lost abut 20-25 lbs already.     Is able to walk further distances now with the weight loss.  Discussed some weight loss medications with patient.  He hasn't been on any in the past    Starting Weight: 445 lb  Current Weight 407 lb  ----------------      I spent 40 minutes with this patient today. I offer these suggestions for consideration by Dr. Martinez. A copy of the visit note was provided to the patient's provider(s).    A summary of these recommendations was declined by the patient.      Gladys Garza, PharmD  Medication Therapy Management Pharmacist  Pager: [phone]      Telemedicine Visit Details  Type of service:  Telephone visit  Start Time: 2:00 pm  End Time: 2:40 pm     Medication Therapy Recommendations  No medication therapy recommendations to display

## 2023-03-29 ENCOUNTER — OFFICE VISIT (OUTPATIENT)
Dept: PHARMACY | Facility: PHYSICIAN GROUP | Age: 63
End: 2023-03-29
Payer: COMMERCIAL

## 2023-03-29 DIAGNOSIS — G89.29 OTHER CHRONIC PAIN: Primary | ICD-10-CM

## 2023-03-29 DIAGNOSIS — E66.9 OBESITY, UNSPECIFIED CLASSIFICATION, UNSPECIFIED OBESITY TYPE, UNSPECIFIED WHETHER SERIOUS COMORBIDITY PRESENT: ICD-10-CM

## 2023-03-29 PROCEDURE — 99606 MTMS BY PHARM EST 15 MIN: CPT | Performed by: PHARMACIST

## 2023-05-17 ENCOUNTER — VIRTUAL VISIT (OUTPATIENT)
Dept: PHARMACY | Facility: PHYSICIAN GROUP | Age: 63
End: 2023-05-17
Payer: COMMERCIAL

## 2023-05-17 DIAGNOSIS — E66.9 OBESITY, UNSPECIFIED CLASSIFICATION, UNSPECIFIED OBESITY TYPE, UNSPECIFIED WHETHER SERIOUS COMORBIDITY PRESENT: ICD-10-CM

## 2023-05-17 DIAGNOSIS — G89.29 OTHER CHRONIC PAIN: Primary | ICD-10-CM

## 2023-05-17 DIAGNOSIS — R20.0 NUMBNESS OF FINGERS: ICD-10-CM

## 2023-05-17 PROCEDURE — 99606 MTMS BY PHARM EST 15 MIN: CPT | Mod: 93 | Performed by: PHARMACIST

## 2023-05-17 NOTE — PROGRESS NOTES
Medication Therapy Management (MTM) Encounter    ASSESSMENT:                            Medication Adherence/Access: No issues identified    Numbness in fingers:  Needs further assessment by provider for new concern.  Possibly could be cubital tunnel, needs assessment.     Chronic pain:  too soon to assess how he is doing since being back on oxycodone ER.  He understands that now he is taking OxyContin ER 15 mg twice daily, he will decrease the Oxycodone to 10 mg three times daily (max of 3 per day) starting today.  So his last refill of oxycodone should last longer than 1 month.      Obesity: Discussed that he would be a good candidate for GLP1 agonist therapy such as Wegovy (insurance covers it).  Would like to see patient in person prior to starting this medication.  Due for several labs and blood pressure check in person.  Patient will call to schedule to see Dr. Martinez and myself     PLAN:                            1.  Continue current medications.      Pain medications:  OxyContin ER 15 mg twice daily  Oxycodone 10 mg three times daily as needed for pain (max of 3 tablets per day)    Patient will schedule an in-clinic appointment next month.  At that appointment we can discuss/review GLP1 agonist for weight loss.    Follow-up: Return in about 1 month (around 6/17/2023) for MTM Follow Up.   - Patient will call to schedule in clinic follow up  - we can review Wegovy injection when we meet.    SUBJECTIVE/OBJECTIVE:                          Stephan Montanez is a 63 year old male called for a follow-up visit.  Today's visit is a follow-up MTM visit from 3/29/2023.     Reason for visit: Follow up on chronic pain    Allergies/ADRs: Reviewed in chart  Past Medical History: Reviewed in chart  Tobacco: He reports that he has quit smoking. His smoking use included cigarettes. He has never used smokeless tobacco.  Alcohol: none     Medication Adherence/Access: no issues reported    Numbness in fingers:  Reports new  symptoms of numbness in his left hand - in pinky finger and half of the finger next to it. It's not really severe or bothersome but started noticing recently.    Chronic pain:    Oxycodone-acetaminophen  mg 2 tablets three times daily -decreased to 1 tablets three times daily today.  Filled 180 tablets on 5/9/23, should decrease to three times daily dosing as of today 5/17.    9 days on 6 tablets per day = 54 tablets.  180 tabs - 54 tabs, 126 tabs for 3/day dosing - the rest of the prescription should last another 42 days.  OxyContin ER 15 mg twice daily - started again today (1 hour ago)  Patient had called in earlier this month requesting to switch back to OxyContin from [facility] due to lower efficacy.  It took a few days to get the PA approved for OxyContin.  Not as mobile due to increase in pain, sciatica pain is worse.    Having mostly pain in his knees that bothers him.  Uses heating pad which does help with his knee and leg pain.  Patient reports having sciatica pain from hip that shoots down into his legs several times a day he did not have this previously when he was on OxyContin.  Patient thinks he has tried gabapentin, however I do not see this on his medication history.  MME: 90 MME    Previous medications:   MS Contin - on this for 2 months, wasn't effective in controlling his pain.  He also has constant nausea and other GI side effects  Morphine sulfate - on this for 2 months, and it was not effective in controlling his pain  Oxycodone-Acetaminophen - not effective in controlling his pain.  Needs an extended release medication that has a longer duration of action.  Patient only gets pain relief for about 3 hours with oxycodone-acetaminophen.  Belbuca - not as effective for pain.    Obesity:     Patient has better been working with [place] fat loss diet.  He is eating a lot more vegetables and fruits, portion of meal, a.  Eats twice a day, no bread. They provide a meal plan, amino acid drops  sublingual three times daily, and electrolytes.  Program costs $1600  Reports eating significantly more green vegetables, spinach in his daily diet.  He has found this is working very well and is already lost quite a bit of weight even in the first few weeks.  There was a lot of background noise on the call so I did not hear exactly how much he said he lost. He is losing about 1 lb per day.  Lost abut 20-25 lbs already.     Is able to walk further distances now with the weight loss.  Discussed some weight loss medications with patient.  He hasn't been on any in the past    Starting Weight: 445 lb  Current Weight 407 lb  ----------------      I spent 40 minutes with this patient today. I offer these suggestions for consideration by Dr. Martinez. A copy of the visit note was provided to the patient's provider(s).    A summary of these recommendations was declined by the patient.      Gladys Garza, PharmD  Medication Therapy Management Pharmacist  Pager: [phone]      Telemedicine Visit Details  Type of service:  Telephone visit  Start Time: 2:00 pm  End Time: 2:40 pm     Medication Therapy Recommendations  No medication therapy recommendations to display

## 2023-05-21 RX ORDER — OXYCODONE HYDROCHLORIDE 15 MG/1
15 TABLET, FILM COATED, EXTENDED RELEASE ORAL EVERY 12 HOURS
COMMUNITY

## 2023-05-21 NOTE — PATIENT INSTRUCTIONS
"Recommendations from today's MTM visit:                                                         1.  Continue current medications.      Pain medications:  OxyContin ER 15 mg twice daily  Oxycodone 10 mg three times daily as needed for pain (max of 3 tablets per day)    Make sure you reduce your Oxycodone 10 mg tablets down to three of them per day.  You received 180 tablets on 5/9/23, with the dose decrease starting 5/17 the rest of your prescription should last 40 days from 5/17/23, or until at least 6/21/23    Follow-up: Return in about 1 month (around 6/17/2023) for MTM Follow Up.    It was great speaking with you today.  I value your experience and would be very thankful for your time in providing feedback in our clinic survey. In the next few days, you may receive an email or text message from [employer] with a link to a survey related to your  clinical pharmacist.\"     To schedule another MTM appointment, please call the clinic directly or you may call the MTM scheduling line at [phone] or toll-free at [phone].     My Clinical Pharmacist's contact information:                                                      Please feel free to contact me with any questions or concerns you have.      Gladys Corbin, Marcela  Medication Therapy Management Pharmacist  Pager: [phone]     "

## 2023-08-17 ENCOUNTER — LAB REQUISITION (OUTPATIENT)
Dept: LAB | Facility: CLINIC | Age: 63
End: 2023-08-17

## 2023-08-17 DIAGNOSIS — R73.09 OTHER ABNORMAL GLUCOSE: ICD-10-CM

## 2023-08-17 LAB
ALBUMIN SERPL BCG-MCNC: 4.2 G/DL (ref 3.5–5.2)
ALP SERPL-CCNC: 76 U/L (ref 40–129)
ALT SERPL W P-5'-P-CCNC: 29 U/L (ref 0–70)
ANION GAP SERPL CALCULATED.3IONS-SCNC: 14 MMOL/L (ref 7–15)
AST SERPL W P-5'-P-CCNC: 21 U/L (ref 0–45)
BILIRUB SERPL-MCNC: 0.5 MG/DL
BUN SERPL-MCNC: 18.2 MG/DL (ref 8–23)
CALCIUM SERPL-MCNC: 9.3 MG/DL (ref 8.8–10.2)
CHLORIDE SERPL-SCNC: 105 MMOL/L (ref 98–107)
CHOLEST SERPL-MCNC: 188 MG/DL
CREAT SERPL-MCNC: 0.78 MG/DL (ref 0.67–1.17)
DEPRECATED HCO3 PLAS-SCNC: 23 MMOL/L (ref 22–29)
GFR SERPL CREATININE-BSD FRML MDRD: >90 ML/MIN/1.73M2
GLUCOSE SERPL-MCNC: 111 MG/DL (ref 70–99)
HDLC SERPL-MCNC: 38 MG/DL
LDLC SERPL CALC-MCNC: 124 MG/DL
NONHDLC SERPL-MCNC: 150 MG/DL
POTASSIUM SERPL-SCNC: 3.8 MMOL/L (ref 3.4–5.3)
PROT SERPL-MCNC: 7.1 G/DL (ref 6.4–8.3)
SODIUM SERPL-SCNC: 142 MMOL/L (ref 136–145)
TRIGL SERPL-MCNC: 130 MG/DL

## 2023-08-17 PROCEDURE — 80061 LIPID PANEL: CPT | Performed by: FAMILY MEDICINE

## 2023-08-17 PROCEDURE — 80053 COMPREHEN METABOLIC PANEL: CPT | Performed by: FAMILY MEDICINE

## 2023-08-30 ENCOUNTER — OFFICE VISIT (OUTPATIENT)
Dept: PHARMACY | Facility: PHYSICIAN GROUP | Age: 63
End: 2023-08-30
Payer: COMMERCIAL

## 2023-08-30 VITALS — DIASTOLIC BLOOD PRESSURE: 78 MMHG | SYSTOLIC BLOOD PRESSURE: 136 MMHG | OXYGEN SATURATION: 96 % | HEART RATE: 53 BPM

## 2023-08-30 DIAGNOSIS — E66.9 OBESITY, UNSPECIFIED CLASSIFICATION, UNSPECIFIED OBESITY TYPE, UNSPECIFIED WHETHER SERIOUS COMORBIDITY PRESENT: Primary | ICD-10-CM

## 2023-08-30 PROCEDURE — 99606 MTMS BY PHARM EST 15 MIN: CPT | Performed by: PHARMACIST

## 2023-08-30 NOTE — PROGRESS NOTES
Medication Therapy Management (MTM) Encounter    ASSESSMENT:                            Medication Adherence/Access: No issues identified    Obesity: Discussed that he would be a good candidate for GLP1 agonist therapy such as Wegovy (insurance covers it).  Reviewed how to use the injection and side effects to monitor for.  Discussed rare risks of pancreatitis and gallbladder issues.  Reviewed warning on medullary thyroid carcinoma (seen in rodent studies).  He would like to start Wegovy, is very motivated to lose weight.  Education provided on how to give the weekly injection and the titration schedule.  Patient called his [facility] during the appointment to see if they have lower doses of Wegovy in stock, and they don't, so we will send medication to [facility] mail order (looks like they have all doses in stock right now).    PLAN:                             Plan to start Wegovy weekly injection to help with weight loss    Month 1: Inject Wegovy 0.25 mg once weekly for 4 weeks, then if feeling OK can increase dose to 0.5 mg  Month 2:  Inject Wegovy 0.5 mg once weekly for 4 weeks, then can increase to 1 mg  Month 3: Inject Wegovy 1 mg once weekly    -Wegovy supplies of 0.25 mg, 0.5 mg and 1 mg doses have been limited.  You may need to call around to different pharmacies to find this medication.  I've typically had patients have good luck at [facility] (they mail you the medications), their phone number is [phone].  - I would recommend waiting to start the medication until you have all three doses to make sure you won't have interruptions in therapy.  You could start this medication right away if you'd prefer, but If unable to get the next dose when it's due we may need to continue the same lower dose for another month.    Follow-up: 1 month after starting Wegovy    SUBJECTIVE/OBJECTIVE:                          Stephan Montanez is a 63 year old male coming in for a follow-up visit.  Today's  visit is a follow-up [facility] [place] visit from 5/17/2023.     Reason for visit: Follow up on chronic pain    Allergies/ADRs: Reviewed in chart  Past Medical History: Reviewed in chart  Tobacco: He reports that he has quit smoking. His smoking use included cigarettes. He has never used smokeless tobacco.  Alcohol: none     Medication Adherence/Access: no issues reported    Obesity:     Patient would like to start Wegovy or other weight loss medication.  He hasn't been as good with his diet lately, eating a lot of fast food.  He plans to change diet starting today.  He was eating a lot more vegetables and fruits.  Meals twice a day that are smaller portioned, no bread. They provide a meal plan, amino acid drops sublingual three times daily, and electrolytes. The [place] fat loss Program costs $1600  On that program he had lost about 50 lbs, but is very expensive.  Is able to walk further distances now with the weight loss.    Starting Weight: 445 lb (prior to diet changes)  Current Weight 407 lb  ----------------      I spent 40 minutes with this patient today. I offer these suggestions for consideration by Dr. Martinez. A copy of the visit note was provided to the patient's provider(s).    A summary of these recommendations was declined by the patient.      Lissette WellsD  Medication Therapy Management Pharmacist  Pager: [phone]      Telemedicine Visit Details  Type of service:  Telephone visit  Start Time: 2:00 pm  End Time: 2:40 pm     Medication Therapy Recommendations  No medication therapy recommendations to display

## 2023-09-01 RX ORDER — LISINOPRIL 10 MG/1
10 TABLET ORAL DAILY
COMMUNITY

## 2023-10-03 NOTE — PROGRESS NOTES
Medication Therapy Management (MTM) Encounter    ASSESSMENT:                            Medication Adherence/Access: No issues identified    Obesity: plan in place, tolerating Wegovy well so far.    PLAN:                            Continue plan to titrate Wegovy as tolerated.    Month 1: Inject Wegovy 0.25 mg once weekly for 4 weeks, then if feeling OK can increase dose to 0.5 mg  Month 2:  Inject Wegovy 0.5 mg once weekly for 4 weeks, then can increase to 1 mg  Month 3: Inject Wegovy 1 mg once weekly    Follow-up: 1 month     SUBJECTIVE/OBJECTIVE:                          Stepahn Montanez is a 63 year old male called for a follow-up visit.  Today's visit is a follow-up MTM visit from 8/30/2023.     Reason for visit: Follow up on new Wegovy start.    Allergies/ADRs: Reviewed in chart  Past Medical History: Reviewed in chart  Tobacco: He reports that he has quit smoking. His smoking use included cigarettes. He has never used smokeless tobacco.  Alcohol: none     Medication Adherence/Access: no issues reported    Obesity:     Semaglutide (as Ozempic) 0.25 mg weekly- 2 weeks  No side effects, doesn't feel any different.  No GI side effects yet, tolerating well.  No change in appetite or weight yet.  He has just restarted his diet plan about two weeks ago.  I gave the patient an Ozempic sample of the 0.25/0.5 mg pen so he could start the semaglutide sooner.  The pharmacy was not able to fill the starting dose 0.25 mg pen and he had already received the 0.5 mg pen.  I didn't want him to start the dose too high and possibly have severe side effects.  Diet: Omelettes with ham AM. Lunch - soup. Dinner - veggies with chicken   eating more healthy the last couple weeks.  Prior to going back to his diet he was eating a lot more fatty/fried foods, and foods with higher carb content.  Was eating a lot chow mien - fried rice, chicken  We was on the [place] fat loss Program, but it cost $1600/month.  He did lose about 50 lbs on that.      Starting Weight (prior to Wegovy): 445 lb (prior to diet changes)      ----------------      I spent 40 minutes with this patient today. I offer these suggestions for consideration by Dr. Martinez. A copy of the visit note was provided to the patient's provider(s).    A summary of these recommendations was declined by the patient.      Gladys Garza PharmD  Medication Therapy Management Pharmacist  Pager: [phone]      Telemedicine Visit Details  Type of service:  Telephone visit  Start Time: 2:00 pm  End Time: 2:40 pm     Medication Therapy Recommendations  No medication therapy recommendations to display

## 2023-10-04 ENCOUNTER — VIRTUAL VISIT (OUTPATIENT)
Dept: PHARMACY | Facility: PHYSICIAN GROUP | Age: 63
End: 2023-10-04
Payer: COMMERCIAL

## 2023-10-04 DIAGNOSIS — E66.9 OBESITY, UNSPECIFIED CLASSIFICATION, UNSPECIFIED OBESITY TYPE, UNSPECIFIED WHETHER SERIOUS COMORBIDITY PRESENT: Primary | ICD-10-CM

## 2023-10-04 PROCEDURE — 99606 MTMS BY PHARM EST 15 MIN: CPT | Performed by: PHARMACIST

## 2023-11-13 ENCOUNTER — VIRTUAL VISIT (OUTPATIENT)
Dept: PHARMACY | Facility: PHYSICIAN GROUP | Age: 63
End: 2023-11-13
Payer: COMMERCIAL

## 2023-11-13 DIAGNOSIS — E66.9 OBESITY, UNSPECIFIED CLASSIFICATION, UNSPECIFIED OBESITY TYPE, UNSPECIFIED WHETHER SERIOUS COMORBIDITY PRESENT: Primary | ICD-10-CM

## 2023-11-13 PROCEDURE — 99606 MTMS BY PHARM EST 15 MIN: CPT | Mod: 93 | Performed by: PHARMACIST

## 2023-11-13 NOTE — PROGRESS NOTES
Medication Therapy Management (MTM) Encounter    ASSESSMENT:                            Medication Adherence/Access: No issues identified    Obesity: plan in place, tolerating Wegovy well so far.  Will continue to titrate his dose each month if he feels OK.  Encouraged him to continue working on diet    PLAN:                            Continue plan to titrate Wegovy as tolerated.    Month 1: Inject Wegovy 0.25 mg once weekly for 4 weeks, then if feeling OK can increase dose to 0.5 mg  Month 2:  Inject Wegovy 0.5 mg once weekly for 4 weeks, then can increase to 1 mg  Month 3: Inject Wegovy 1 mg once weekly    Follow-up: 4 weeks.  Due for primary care provider appointment, helping him schedule visit    SUBJECTIVE/OBJECTIVE:                          Stephan Montanez is a 63 year old male called for a follow-up visit.  Today's visit is a follow-up MTM visit from 8/30/2023.     Reason for visit: Follow up on new Wegovy start.    Allergies/ADRs: Reviewed in chart  Past Medical History: Reviewed in chart  Tobacco: He reports that he has quit smoking. His smoking use included cigarettes. He has never used smokeless tobacco.  Alcohol: none     Medication Adherence/Access: no issues reported    Obesity:     Semaglutide (as Ozempic) 0.5 mg weekly- started this dose 1 week ago  Is noticing some gas, but otherwise feels good on it.    No side effects, doesn't feel any different.  No GI side effects yet, tolerating well.  No change in appetite or weight yet.  He has just restarted his diet plan about two weeks ago. He has been cutting down on calories, portion sizes.  Trying to avoid candy  Diet: Omelettes with ham AM. Lunch - soup. Dinner - veggies with chicken   eating more healthy the last couple weeks.  Prior to going back to his diet he was eating a lot more fatty/fried foods, and foods with higher carb content.  Was eating a lot chow mien - fried rice, chicken  [place] was [place] the [place] fat loss Program, but it cost $1600/month.  He  did lose about 50 lbs on that.     Starting Weight (prior to Wegovy): 445 lb (prior to diet changes)      ----------------      I spent 20 minutes with this patient today. I offer these suggestions for consideration by Dr. Martinez. A copy of the visit note was provided to the patient's provider(s).    A summary of these recommendations was declined by the patient.      Lissette WellsD  Medication Therapy Management Pharmacist  Pager: [phone]      Telemedicine Visit Details  Type of service:  Telephone visit  Start Time: 2:00 pm  End Time: 2:20pm     Medication Therapy Recommendations  No medication therapy recommendations to display    Benjamin Richardson(Attending)

## 2023-12-18 ENCOUNTER — VIRTUAL VISIT (OUTPATIENT)
Dept: PHARMACY | Facility: PHYSICIAN GROUP | Age: 63
End: 2023-12-18
Payer: COMMERCIAL

## 2023-12-18 DIAGNOSIS — E66.9 OBESITY, UNSPECIFIED CLASSIFICATION, UNSPECIFIED OBESITY TYPE, UNSPECIFIED WHETHER SERIOUS COMORBIDITY PRESENT: Primary | ICD-10-CM

## 2023-12-18 PROCEDURE — 99606 MTMS BY PHARM EST 15 MIN: CPT | Mod: 93 | Performed by: PHARMACIST

## 2023-12-18 RX ORDER — SEMAGLUTIDE 1.7 MG/.75ML
1.7 INJECTION, SOLUTION SUBCUTANEOUS WEEKLY
COMMUNITY
End: 2024-02-27 | Stop reason: DRUGHIGH

## 2023-12-18 NOTE — PROGRESS NOTES
Medication Therapy Management (MTM) Encounter    ASSESSMENT:                            Medication Adherence/Access: No issues identified    Obesity: plan in place, tolerating Wegovy well so far.  Will continue to titrate his dose each month if he feels OK.  Encouraged him to continue working on diet    PLAN:                            After completing the rest of the 1 mg pens, can increase Wegovy to 1.7 mg once weekly    Follow-up: 4 weeks.      SUBJECTIVE/OBJECTIVE:                          Stephan Montanez is a 63 year old male called for a follow-up visit.  Today's visit is a follow-up MTM visit from 11/13/2023.     Reason for visit: Follow up on new Wegovy titration.    Allergies/ADRs: Reviewed in chart  Past Medical History: Reviewed in chart  Tobacco: He reports that he has quit smoking. His smoking use included cigarettes. He has never used smokeless tobacco.  Alcohol: none     Medication Adherence/Access: no issues reported    Obesity:     Semaglutide (Wegovy) 1 mg weekly- 3 doses   Denies GI pain, nausea. Just noticing some flatulence/bloating, but overall tolerating well.  No change in appetite or weight yet.  He has just restarted his diet plan about two weeks ago. He has been cutting down on calories, portion sizes.  Trying to avoid candy  Diet:   Trying to eat a lot of veggies, omelets for breakfast.  Lunch - sometimes skips but might have soup.  Dinner - pork chops, chicken, plus a veggie.  Likes bhatia mein.  Trying to avoid snacking.    [place] the [place] fat [place] Program, but it cost $1600/month.  He did lose about 50 lbs on that.   Starting Weight (prior to Wegovy): 445 lb (prior to diet changes).  Said he hasn't weighed himself.  Doesn't think his weight has changed much, clothes fit the same.      ----------------      I spent 20 minutes with this patient today. I offer these suggestions for consideration by Dr. Martinez. A copy of the visit note was provided to the patient's provider(s).    A  summary of these recommendations was declined by the patient.      Gladys Garza, PharmD  Medication Therapy Management Pharmacist  Pager: [phone]      Telemedicine Visit Details  Type of service:  Telephone visit  Start Time: 2:00 pm  End Time: 2:20pm     Medication Therapy Recommendations  Obesity, unspecified classification, unspecified obesity type, unspecified whether serious comorbidity present    Current Medication: Semaglutide-Weight Management (WEGOVY) 1 MG/0.5ML pen (Discontinued)   Rationale: Dose too low - Dosage too low - Effectiveness   Recommendation: Increase Dose   Status: Accepted per CPA

## 2024-01-23 ENCOUNTER — VIRTUAL VISIT (OUTPATIENT)
Dept: PHARMACY | Facility: PHYSICIAN GROUP | Age: 64
End: 2024-01-23
Payer: COMMERCIAL

## 2024-01-23 DIAGNOSIS — R06.02 SHORTNESS OF BREATH: ICD-10-CM

## 2024-01-23 DIAGNOSIS — I48.20 CHRONIC ATRIAL FIBRILLATION (H): ICD-10-CM

## 2024-01-23 DIAGNOSIS — R05.9 COUGH, UNSPECIFIED TYPE: ICD-10-CM

## 2024-01-23 DIAGNOSIS — F41.9 ANXIETY: ICD-10-CM

## 2024-01-23 DIAGNOSIS — R60.9 EDEMA, UNSPECIFIED TYPE: ICD-10-CM

## 2024-01-23 DIAGNOSIS — R09.89 CHEST CONGESTION: ICD-10-CM

## 2024-01-23 DIAGNOSIS — E66.9 OBESITY, UNSPECIFIED CLASSIFICATION, UNSPECIFIED OBESITY TYPE, UNSPECIFIED WHETHER SERIOUS COMORBIDITY PRESENT: Primary | ICD-10-CM

## 2024-01-23 DIAGNOSIS — G89.29 OTHER CHRONIC PAIN: ICD-10-CM

## 2024-01-23 DIAGNOSIS — R68.2 DRY MOUTH: ICD-10-CM

## 2024-01-23 DIAGNOSIS — I10 BENIGN ESSENTIAL HYPERTENSION: ICD-10-CM

## 2024-01-23 DIAGNOSIS — G47.00 INSOMNIA, UNSPECIFIED TYPE: ICD-10-CM

## 2024-01-23 PROCEDURE — 99607 MTMS BY PHARM ADDL 15 MIN: CPT | Mod: 93 | Performed by: PHARMACIST

## 2024-01-23 PROCEDURE — 99605 MTMS BY PHARM NP 15 MIN: CPT | Mod: 93 | Performed by: PHARMACIST

## 2024-01-23 NOTE — PATIENT INSTRUCTIONS
"Recommendations from today's MTM visit:                                                    MTM (medication therapy management) is a service provided by a clinical pharmacist designed to help you get the most of out of your medicines.   Today we reviewed what your medicines are for, how to know if they are working, that your medicines are safe and how to make your medicine regimen as easy as possible.      Continue Wegovy 1.7 mg weekly  Monitor your blood pressure a few days a week  Please let us know if your blood pressure is frequently above 140/90 or below 90/50 or if you are experiencing feeling extreme dizziness or weakness    3. Try Biotene mouth wash as needed for dry mouth    Follow-up: Return in about 5 weeks (around 2/27/2024) for MTM Follow Up.    It was great speaking with you today.  I value your experience and would be very thankful for your time in providing feedback in our clinic survey. In the next few days, you may receive an email or text message from [employer] with a link to a survey related to your  clinical pharmacist.\"     To schedule another MTM appointment, please call the clinic directly     My Clinical Pharmacist's contact information:                                                      Please feel free to contact me with any questions or concerns you have.      Gladys Corbin, PharmD  Medication Therapy Management Pharmacist    "

## 2024-01-23 NOTE — PROGRESS NOTES
Medication Therapy Management (MTM) Encounter    ASSESSMENT:                            Medication Adherence/Access: No issues identified  .  Obesity: plan in place, tolerating Wegovy well so far.  Would normally consider reducing the dose of medication if having more than 2 lb weight loss per week.  However, I worry he wouldn't be able to find the 1 mg dose and he is having no adverse effects.  Still eating, has energy and feeling good.  Patient would also prefer to stay on current dose.  .  Insomnia: normally doing well unless pain is worse.  .  Anxiety: Stable.  .  Afib/Hypertension/Edema: He would benefit from using the blood pressure monitor.  Will follow up on his INRs, he may be a candidate for a DOAC.  Will discuss with primary care provider.  .  Chronic pain:  Stable.   .  Cough/Congestion: Suggested considering using other routes for taking marijuana - smoking seems to be causing the cough/congestion.   .  Shortness of breath:  Stable.  .    PLAN:                            Continue Wegovy 1.7 mg weekly  Monitor your blood pressure a few days a week  Please let us know if your blood pressure is frequently above 140/90 or below 90/50 or if you are experiencing feeling extreme dizziness or weakness    3. Try Biotene mouth wash as needed for dry mouth    Follow-up: 4 -5 weeks.    - After our appointment I will try to find out what his most recent INRs are - didn't find any documents in his chart.  Will talk to our RN or provider     SUBJECTIVE/OBJECTIVE:                          Stephan Montanez is a 63 year old male called for a follow-up visit.  Today's visit is a follow-up MTM visit from 12/18/2023.     Reason for visit: Follow up on new Wegovy titration.    Allergies/ADRs: Reviewed in chart  Past Medical History: Reviewed in chart  Tobacco: He reports that he has quit smoking. His smoking use included cigarettes. He has never used smokeless tobacco.  He also smokes marijuana (non-medical)   Alcohol: none      Medication Adherence/Access: no issues reported    Obesity:     Semaglutide (Wegovy) 1.7 mg weekly- 3 doses so far  Starting to see weight loss now, Last week he lost 4.5 lbs.  He's cut back a lot on portions, and eating much healthier choices.  He is being more conscious of what he eats.  Denies GI pain, nausea. Just noticing some flatulence/bloating, but overall tolerating well.  No change in appetite or weight yet.  He has just restarted his diet plan about two weeks ago. He has been cutting down on calories, portion sizes.  Trying to avoid candy  Diet:   Trying to eat a lot of veggies, omelets for breakfast.  Lunch - sometimes skips but might have soup.  Dinner - pork chops, chicken, plus a veggie.  Likes chow mien.  Trying to avoid snacking.    [place] the [place] fat [place] Program, but it cost $1600/month.  He did lose about 50 lbs on that.   Starting Weight (prior to Wegovy): 445 lb (prior to diet changes).  Said he hasn't weighed himself.     Insomnia:   Trazodone 100-200 mg at bedtime.   He hasn't been sleeping well but primarily due to pain.  With the changes in his pain medications he wakes up a lot with pain. Patient reports trouble staying asleep and trouble falling asleep.     Anxiety:   Not taking any medications at this time, said his anxiety is doing ok, doesn't want to try any new medications at this time.  Anxiety is worse when pain is worse  Previous meds tried:    Escitalopram  Hydroxyzine - made anxiety worse.     Afib/Hypertension/Edema:   Warfarin daily per INR for anticoagulation.   Metoprolol  mg daily  Torsemide 20 mg twice daily as needed (doesn't use every day, uses a few days a month).  Lisinopril 10 mg daily  Patient reports no current concerns of bruising or bleeding. Patient does not have a history of GI bleed.   Tests INR at home and said it's been a bit low lately, didn't remember any INR values.  He doesn't know the name of the company, but said he calls '[employer]' to  report his INR levels  He did get a blood pressure machine but forgot to start checking blood pressure at home.  He will start doing this.  Patient reports no current medication side effects.   Patient does not self-monitor blood pressure.    Chronic pain:    Oxycodone-acetaminophen  mg 2 tablets three times daily -decreased to 1 tablets three times daily today.  Filled 180 tablets on 5/9/23, should decrease to three times daily dosing as of today 5/17.    9 days on 6 tablets per day = 54 tablets.  180 tabs - 54 tabs, 126 tabs for 3/day dosing - the rest of the prescription should last another 42 days.  OxyContin ER 15 mg twice daily  Feels his pain is better managed being back on OxyContin vs Belbuca.     Having mostly pain in his knees that bothers him.  Uses heating pad which does help with his knee and leg pain.  Patient thinks he has tried gabapentin, however I do not see this on his medication history.  MME: 90 MME     Previous medications:   MS Contin - on this for 2 months, wasn't effective in controlling his pain.  He also has constant nausea and other GI side effects  Morphine sulfate - on this for 2 months, and it was not effective in controlling his pain  Oxycodone-Acetaminophen - not effective in controlling his pain.  Needs an extended release medication that has a longer duration of action.  Patient only gets pain relief for about 3 hours with oxycodone-acetaminophen.  Belbuca - not as effective for pain.     Cough/Congestion:   Mucinex ER 1200 mg twice daily as needed for cough/congestion.  Reports not using daily, but takes on occasion when he has a cold.  He smokes a few marijuana at night, notices dry mouth, cough and chest congestion after smoking  Hasn't smoked tobacco products in several years.     Shortness of breath:    Ventolin inhaler as needed for shortness of breath.  Typically only needs this when he has a cold and the cough is lingering.  Not using often.  Gets short of breath  sometimes with strenuous activity     ----------------      I spent 20 minutes with this patient today. I offer these suggestions for consideration by Dr. Martinez. A copy of the visit note was provided to the patient's provider(s).    A summary of these recommendations was declined by the patient.      Lissette WellsD  Medication Therapy Management Pharmacist  Pager: [phone]      Telemedicine Visit Details  Type of service:  Telephone visit  Start Time: 2:00 pm  End Time: 2:20pm     Medication Therapy Recommendations  Dry mouth    Rationale: Untreated condition - Needs additional medication therapy - Indication   Recommendation: Start Medication   Status: Accepted per CPA   Note: biotene

## 2024-02-27 ENCOUNTER — VIRTUAL VISIT (OUTPATIENT)
Dept: PHARMACY | Facility: PHYSICIAN GROUP | Age: 64
End: 2024-02-27
Payer: COMMERCIAL

## 2024-02-27 DIAGNOSIS — I10 BENIGN ESSENTIAL HYPERTENSION: ICD-10-CM

## 2024-02-27 DIAGNOSIS — I48.20 CHRONIC ATRIAL FIBRILLATION (H): ICD-10-CM

## 2024-02-27 DIAGNOSIS — E66.9 OBESITY, UNSPECIFIED CLASSIFICATION, UNSPECIFIED OBESITY TYPE, UNSPECIFIED WHETHER SERIOUS COMORBIDITY PRESENT: Primary | ICD-10-CM

## 2024-02-27 DIAGNOSIS — R60.9 EDEMA, UNSPECIFIED TYPE: ICD-10-CM

## 2024-02-27 PROCEDURE — 99606 MTMS BY PHARM EST 15 MIN: CPT | Mod: 93 | Performed by: PHARMACIST

## 2024-02-27 PROCEDURE — 99607 MTMS BY PHARM ADDL 15 MIN: CPT | Mod: 93 | Performed by: PHARMACIST

## 2024-02-27 NOTE — PROGRESS NOTES
Medication Therapy Management (MTM) Encounter    ASSESSMENT:                            Medication Adherence/Access: No issues identified  .  Obesity: Would benefit from increasing Wegovy to 2.4 mg.  .  Afib/Hypertension/Edema:  After calculating his BMI (59.5), DOACs are not recommended in patients with BMI over 50 because efficacy hasn't been well studied.  His self reported INR's have been below goal- will call the company that is supplying his warfarin and INR tests to discuss increasing his dosage or warfarin.      PLAN:                             Increase Wegovy to 2.4 mg once weekly  Patient will call me back today and give me the phone number for the company that manages his warfarin - I will confirm his last few INR values and see if they have been adjusting his dose.    Follow-up: 2 months - wegovy increase and home blood pressure     SUBJECTIVE/OBJECTIVE:                          Stephan Montanez is a 64 year old male called for a follow-up visit from 1/23/24.       Reason for visit: follow up on Wegovy titration.    Allergies/ADRs: Reviewed in chart  Past Medical History: Reviewed in chart  Tobacco: He reports that he has quit smoking. His smoking use included cigarettes. He has never used smokeless tobacco.  He also smokes marijuana (non-medical)   Alcohol: none     Medication Adherence/Access: no issues reported     Obesity:     Semaglutide (Wegovy) 1.7 mg weekly-  He would like to increase the dose, feels the last couple weeks his weight has been stable/not seeing much weight loss.  Denies GI pain, nausea. Just noticing some flatulence/bloating, but overall tolerating well.  No change in appetite or weight yet.  He has just restarted his diet plan about two weeks ago. He has been cutting down on calories, portion sizes.  Trying to avoid candy  Diet:   Trying to eat a lot of veggies, omelets for breakfast.  Lunch - sometimes skips but might have soup.  Dinner - pork chops, chicken, plus a veggie.   "Likes jannette smyth.  Trying to avoid snacking.  Said he's still having a hard time with diet, still having some carbs in diet with meals.  [place] the [place] fat [place] Program, but it cost $1600/month.  He did lose about 50 lbs on that.   Starting Weight (prior to Wegovy): 445 lb (prior to diet changes).  His current weight as of 2/27 is 403 lb      Afib/Hypertension/Edema:   Warfarin daily per INR for anticoagulation.   Metoprolol  mg daily  Torsemide 20 mg twice daily as needed (doesn't use every day, uses a few days a month).  Lisinopril 10 mg daily  Patient reports no current concerns of bruising or bleeding. Patient does not have a history of GI bleed.   Tests INR at home, He thinks that last week it was 1.8.  He doesn't know the name of the company, but said he calls '[employer] company' to report his INR levels and they adjust his warfarin.    He did get a blood pressure machine but forgot to start checking blood pressure at home.  He will start doing this.  Patient reports no current medication side effects.   Patient does not self-monitor blood pressure.    Today's Vitals: There were no vitals taken for this visit. -  Patient reports:  Height is 5' 9\" and weight is 403 lb, BMI is 59.5  ----------------      I spent 20 minutes with this patient today. All changes were made via collaborative practice agreement with Juan Martinez MD. A copy of the visit note was provided to the patient's provider(s).    A summary of these recommendations was declined by the patient.    Gladys Corbin, LissetteD  Medication Therapy Management Pharmacist  Pager: [phone]      Telemedicine Visit Details  Type of service:  Telephone visit  Start Time:  8:30  End Time:  8:50 AM     Medication Therapy Recommendations  No medication therapy recommendations to display   "

## 2024-04-09 ENCOUNTER — VIRTUAL VISIT (OUTPATIENT)
Dept: PHARMACY | Facility: PHYSICIAN GROUP | Age: 64
End: 2024-04-09
Payer: COMMERCIAL

## 2024-04-09 DIAGNOSIS — I48.20 CHRONIC ATRIAL FIBRILLATION (H): Primary | ICD-10-CM

## 2024-04-09 DIAGNOSIS — R60.9 EDEMA, UNSPECIFIED TYPE: ICD-10-CM

## 2024-04-09 DIAGNOSIS — I10 BENIGN ESSENTIAL HYPERTENSION: ICD-10-CM

## 2024-04-09 PROCEDURE — 99607 MTMS BY PHARM ADDL 15 MIN: CPT | Mod: 93 | Performed by: PHARMACIST

## 2024-04-09 PROCEDURE — 99606 MTMS BY PHARM EST 15 MIN: CPT | Mod: 93 | Performed by: PHARMACIST

## 2024-04-09 NOTE — PROGRESS NOTES
Medication Therapy Management (MTM) Encounter    ASSESSMENT:                            Medication Adherence/Access: see below  .  Afib/Hypertension/Edema: would benefit from reducing his warfarin dose a small amount just while he is on ciprofloxacin, then can resume his previous dose since his last INR was within goal 2-3.  Would like to simplify his regimen in the future (I.e split up the doses to either days he takes 2 tablets or 2.5 tablets and not have any days where he takes 3 tabs)      PLAN:                             Decrease Warfarin 2.5 mg for 1 week while on cipro:  Take 2 tabs Sun/Tues/Thurs/Fri, 2.5 tabs Mon/Wed/Sat.  Once you are off Ciprofloxacin you can resume the previous dose:   2 tabs Sun/Tues/Thurs/Fri, 2.5 tablets Mon, and 3 tabs Wed/Sat.    Follow-up: 2 weeks on INR  - We could consider changing his warfarin to easier regimen: 2.5 tablets on Sun/Mon/Wed/Thurs/Sat, and 2 tabs on Tues/Fri, but will hold off to see what INR is on 4/24 after completing cipro.  - will ask our [facility] RN to call him next week to check what INR is    SUBJECTIVE/OBJECTIVE:                          Stephan Montanez is a 64 year old male called for a follow-up visit     Reason for visit: follow up on INR levels    Allergies/ADRs: Reviewed in chart  Past Medical History: Reviewed in chart  Tobacco: He reports that he has quit smoking. His smoking use included cigarettes. He has never used smokeless tobacco.  He also smokes marijuana (non-medical)   Alcohol: none     Medication Adherence/Access: no issues reported     Afib/Hypertension/Edema:   - Warfarin 2.5 mg daily: 2 tabs Sun/Tues/Thurs/Fri, 2.5 tablets Mon, and 3 tabs Wed/Sat.  - Metoprolol  mg daily  - Torsemide 20 mg twice daily as needed (doesn't use every day, uses a few days a month).  - Lisinopril 10 mg daily  Patient reports no current concerns of bruising or bleeding. Patient does not have a history of GI bleed.   After my last appointment with him I found  out that the clinic had not been getting the INR readings from [facility], [place] [employer]R company- so the patient has been adjusting his own warfarin dose the last few months.   Since 2/28 he has been taking 2 tabs Sun/Tues/Thurs/Fri, 2.5 tablets Mon, and 3 tabs Wed/Sat (16.5 tablets per week)  Our clinic manager helped get the clinic fax number updated with the above company, so we should be receiving his INR levels soon.     INR readings: 3/6 1.9, 3/13 1.9, 3/20 1.8, 3/27 1.8 and 4/3 2.1  He will be on an antibiotic Ciprofloxacin 500 mg twice daily for 7 days which can increase INR    ----------------      I spent 20 minutes with this patient today. All changes were made via collaborative practice agreement with Juan Martinez MD. A copy of the visit note was provided to the patient's provider(s).    A summary of these recommendations was declined by the patient.    Gladys Corbin, LissetteD  Medication Therapy Management Pharmacist  Pager: [phone]      Telemedicine Visit Details  Type of service:  Telephone visit  Start Time:  8:30  End Time:  8:50 AM     Medication Therapy Recommendations  Chronic atrial fibrillation (H)    Current Medication: warfarin ANTICOAGULANT (COUMADIN) 2.5 MG tablet   Rationale: Dose too high - Dosage too high - Safety   Recommendation: Decrease Dose   Status: Accepted per CPA   Note: decrease dose only while on cipro

## 2024-04-25 ENCOUNTER — VIRTUAL VISIT (OUTPATIENT)
Dept: PHARMACY | Facility: PHYSICIAN GROUP | Age: 64
End: 2024-04-25
Payer: COMMERCIAL

## 2024-04-25 DIAGNOSIS — I10 BENIGN ESSENTIAL HYPERTENSION: ICD-10-CM

## 2024-04-25 DIAGNOSIS — E66.9 OBESITY, UNSPECIFIED CLASSIFICATION, UNSPECIFIED OBESITY TYPE, UNSPECIFIED WHETHER SERIOUS COMORBIDITY PRESENT: ICD-10-CM

## 2024-04-25 DIAGNOSIS — R60.9 EDEMA, UNSPECIFIED TYPE: ICD-10-CM

## 2024-04-25 DIAGNOSIS — I48.20 CHRONIC ATRIAL FIBRILLATION (H): Primary | ICD-10-CM

## 2024-04-25 PROCEDURE — 99606 MTMS BY PHARM EST 15 MIN: CPT | Mod: 93 | Performed by: PHARMACIST

## 2024-04-25 PROCEDURE — 99607 MTMS BY PHARM ADDL 15 MIN: CPT | Mod: 93 | Performed by: PHARMACIST

## 2024-04-25 NOTE — PROGRESS NOTES
Medication Therapy Management (MTM) Encounter    ASSESSMENT:                            Medication Adherence/Access: see below  .  Afib/Hypertension/Edema: would benefit from increasing his warfarin dose a small amount, has been on the low end of goal range the last few weeks.  INR goal 2-3.  His home blood pressure readings are above goal of 140/90 - reviewed technique for checking blood pressure.  If still elevated with correct technique may benefit form increasing lisinopril dose.  .  Obesity:  Plan in place, recently got back on Wegovy.    PLAN:                            Increase warfarin 2.5 mg by adding one additional half tablet:  Take 2 tabs Sun/Tues/Fri, 2.5 tablets Mon/wed/thurs/sat.  2.  Increase how often you check blood pressure to three days weekly and try to check at different times of day.  May sure you have been sitting/resting for 5-10 minutes before checking it, have both feet flat on the ground and don't talk while checking.    Follow-up:   Appointments in Next Year      May 23, 2024  2:00 PM  Pharmacist Visit with Gladys Corbin RPH  [facility] ([facility] ) [phone]            SUBJECTIVE/OBJECTIVE:                          Stephan Montanez is a 64 year old male called for a follow-up visit.    Reason for visit: follow up on INR levels, weight management and hypertension    Allergies/ADRs: Reviewed in chart  Past Medical History: Reviewed in chart  Tobacco: He reports that he has quit smoking. His smoking use included cigarettes. He has never used smokeless tobacco.  He also smokes marijuana (non-medical)   Alcohol: none     Medication Adherence/Access: no issues reported     Afib/Hypertension/Edema:   - Warfarin 2.5 mg daily: 2 tabs Sun/Tues/Thurs/Fri, 2.5 tablets Mon/wed/sat.  - Metoprolol  mg daily  - Torsemide 20 mg twice daily as needed (doesn't use every day, uses a few days a month).  - Lisinopril 10 mg  daily  Patient reports no current concerns of bruising or bleeding. Patient does not have a history of GI bleed. denies chest pain and dyspnea  Home blood pressure- 142/104, 151/124, 155/104, 164/104, 134/113 (while on phone)  Usually checks blood pressure in the morning.  Not resting 5-10 minutes before he checks blood pressure, usually sits down and takes it.    INR readings:   4/24- 2.1  4/17 - 2  4/3 - 2.1  3/27- 1.8   3/20 - 1.8  3/13 - 1.9    Obesity:     - Semaglutide (Wegovy) 2.4 mg weekly-restarted 2 weeks ago  He had been out of it for 2 weeks while the PA was getting renewed.  Said it's been very helpful for weight loss.  He's down about 45 lbs total since starting it.    Denies GI pain, nausea. Just noticing some flatulence/bloating, but overall tolerating well.  No change in appetite or weight yet.  He has just restarted his diet plan about two weeks ago. He has been cutting down on calories, portion sizes.  Trying to avoid candy.  Diet:   Trying to eat a lot of veggies, omelets for breakfast.  Lunch - sometimes skips but might have soup.  Dinner - pork chops, chicken, plus a veggie.  Likes chow mien.  Trying to avoid snacking.  Said he's still having a hard time with diet, still having some carbs in diet with meals.  We was on the [place] fat loss Program, but it cost $1600/month.  He did lose about 50 lbs on that.   Starting Weight (prior to Wegovy): 445 lb (prior to diet changes).    2/27 - 403 lb        ----------------      I spent 40 minutes with this patient today. All changes were made via collaborative practice agreement with Juan Martinez MD. A copy of the visit note was provided to the patient's provider(s).    A summary of these recommendations was declined by the patient.    Gladys Corbin, Marcela  Medication Therapy Management Pharmacist    Telemedicine Visit Details  Type of service:  Telephone visit  Start Time: 2 pm  End Time: 2:45 pm     Medication Therapy Recommendations  No medication  therapy recommendations to display

## 2024-05-23 ENCOUNTER — VIRTUAL VISIT (OUTPATIENT)
Dept: PHARMACY | Facility: PHYSICIAN GROUP | Age: 64
End: 2024-05-23
Payer: COMMERCIAL

## 2024-05-23 VITALS — DIASTOLIC BLOOD PRESSURE: 80 MMHG | SYSTOLIC BLOOD PRESSURE: 130 MMHG

## 2024-05-23 DIAGNOSIS — R60.9 EDEMA, UNSPECIFIED TYPE: ICD-10-CM

## 2024-05-23 DIAGNOSIS — E66.9 OBESITY, UNSPECIFIED CLASSIFICATION, UNSPECIFIED OBESITY TYPE, UNSPECIFIED WHETHER SERIOUS COMORBIDITY PRESENT: ICD-10-CM

## 2024-05-23 DIAGNOSIS — I48.20 CHRONIC ATRIAL FIBRILLATION (H): Primary | ICD-10-CM

## 2024-05-23 DIAGNOSIS — I10 BENIGN ESSENTIAL HYPERTENSION: ICD-10-CM

## 2024-05-23 PROCEDURE — 99606 MTMS BY PHARM EST 15 MIN: CPT | Mod: 93 | Performed by: PHARMACIST

## 2024-05-23 NOTE — PROGRESS NOTES
Medication Therapy Management (MTM) Encounter    ASSESSMENT:                            Medication Adherence/Access: see below  .  Afib/Hypertension/Edema: Stable.  INR goal 2-3.  His home blood pressure readings is below goal of <140/90.  .  Obesity: Stable.     PLAN:                            Continue current medications    Follow-up:   8/15/24 - 2 PM phone appointment     - notified patient that I will no longer be working with Karel at the time of this visit, and it will actually be moved to our new pharmacists schedule when available.    SUBJECTIVE/OBJECTIVE:                          Stephan Montanez is a 64 year old male called for a follow-up visit.    Reason for visit: follow up on INR levels, weight management and hypertension    Allergies/ADRs: Reviewed in chart  Past Medical History: Reviewed in chart  Tobacco: He reports that he has quit smoking. His smoking use included cigarettes. He has never used smokeless tobacco.  He also smokes marijuana (non-medical)   Alcohol: none     Medication Adherence/Access: no issues reported    HPI: Has a bubble in his nostril, said it was a 'meaty bubble' and he used a rojas clip to pull a portion of it out.  The first time this happened he also had a bunch of puss that came out with it, but said it didn't bleed a lot.  He saw Dr. Valdes and was referred to ENT but wants to see Dr. Martinez first.  I advised him not to try removing this bubble again until seen by a doctor.     Afib/Hypertension/Edema:   - Warfarin 2.5 mg daily: 2 tabs Sun/Tues/Thurs/Fri, 2.5 tablets Mon/wed/sat.  - Metoprolol  mg daily  - Torsemide 20 mg twice daily as needed (doesn't use every day, uses a few days a month).  - Lisinopril 10 mg daily  Patient reports no current concerns of bruising or bleeding. Patient does not have a history of GI bleed. denies chest pain and dyspnea  Home blood pressure- 120-130's/80-92's  Usually checks blood pressure in the morning.  Has been resting 5-10  minutes before taking it now and readings are better  We have been receiving his INR labs from the [employer]    Obesity:     - Semaglutide (Wegovy) 2.4 mg weekly-restarted 6 weeks ago  He had been out of it for 2 weeks while the PA was getting renewed.  Said it's been very helpful for weight loss.  He's down about 45 lbs total since starting it.    Denies GI pain, nausea. Just noticing some flatulence/bloating, but overall tolerating well.  No change in appetite or weight yet.  He has just restarted his diet plan about two weeks ago. He has been cutting down on calories, portion sizes.  Trying to avoid candy.  Exercise - gets tired out really fast, his knees get sore after 60 feet.  Diet:   Trying to eat a lot of veggies, omelets for breakfast.  Lunch - sometimes skips but might have soup.  Dinner - pork chops, chicken, plus a veggie.  Likes chow mien.  Trying to avoid snacking. Said he's still having a hard time with diet, still having some carbs in diet with meals.  [place] was on the [place] fat loss Program, but it cost $1600/month.  He did lose about 50 lbs on that.   Starting Weight (prior to Wegovy): 445 lb (prior to diet changes).    Home Weight:  2/27 - 403 lb   5/23 - 396 lb      ----------------      I spent 30 minutes with this patient today. All changes were made via collaborative practice agreement with Juan Martinez MD. A copy of the visit note was provided to the patient's provider(s).    A summary of these recommendations was declined by the patient.    Gladys Corbin, LissetteD  Medication Therapy Management Pharmacist    Telemedicine Visit Details  Type of service:  Telephone visit  Start Time: 2:00   End Time: 2:30 pm      Medication Therapy Recommendations  No medication therapy recommendations to display

## 2024-08-15 ENCOUNTER — VIRTUAL VISIT (OUTPATIENT)
Dept: PHARMACY | Facility: PHYSICIAN GROUP | Age: 64
End: 2024-08-15
Payer: COMMERCIAL

## 2024-08-15 DIAGNOSIS — E66.9 OBESITY, UNSPECIFIED CLASSIFICATION, UNSPECIFIED OBESITY TYPE, UNSPECIFIED WHETHER SERIOUS COMORBIDITY PRESENT: Primary | ICD-10-CM

## 2024-08-15 DIAGNOSIS — I48.20 CHRONIC ATRIAL FIBRILLATION (H): ICD-10-CM

## 2024-08-15 DIAGNOSIS — G89.29 OTHER CHRONIC PAIN: ICD-10-CM

## 2024-08-15 DIAGNOSIS — I10 BENIGN ESSENTIAL HYPERTENSION: ICD-10-CM

## 2024-08-15 DIAGNOSIS — R60.9 EDEMA, UNSPECIFIED TYPE: ICD-10-CM

## 2024-08-15 PROCEDURE — 99606 MTMS BY PHARM EST 15 MIN: CPT | Mod: 93 | Performed by: PHARMACIST

## 2024-08-15 NOTE — PROGRESS NOTES
Medication Therapy Management (MTM) Encounter    ASSESSMENT:                            Medication Adherence/Access: see below    Afib/Hypertension/Edema:   Stable.  INR goal 2-3.  His home blood pressure readings is below goal of <140/90.    Obesity:     Stable. Congratulated progress     Chronic pain:  Stable on current regimen. Recommended follow up with ortho for further injections if necessary    PLAN:                            Continue current medications and progress on diet     Follow-up:   Appointments in Next Year      Nov 07, 2024 1:00 PM  Pharmacist Visit with Tarah Colmenares [facility] ([facility] ) [phone]            SUBJECTIVE/OBJECTIVE:                          Stephan Montanez is a 64 year old male called for a follow-up visit. We were accompanied by his girlfriend Dasha     Reason for visit: MTM follow-up /establish care     Allergies/ADRs: Reviewed in chart  Past Medical History: Reviewed in chart  Tobacco: He reports that he has quit smoking. His smoking use included cigarettes. He has never used smokeless tobacco.  He also smokes marijuana (non-medical)   Alcohol: none     Medication Adherence/Access: no issues reported  His girlfriend helps with medications.     Afib/Hypertension/Edema:   - Warfarin 2.5 mg daily: 2 tabs Sun/Tues/Thurs/Fri, 3 tablets Mon/wed/sat.  - Metoprolol  mg daily  - Torsemide 20 mg twice daily as needed (doesn't use every day, uses every few weeks).  - Lisinopril 10 mg daily  Patient reports no current concerns of bruising or bleeding. Patient does not have a history of GI bleed. denies chest pain and dyspnea  Home blood pressure- 130's/80-92's  Usually checks blood pressure in the morning.  Has been resting 5-10 minutes before taking it now and readings are better  Checks INR weekly,followed by nurse protocol    Obesity:     - Semaglutide (Wegovy) 2.4 mg weekly-restarted 6 weeks  ago    Said it's been very helpful for weight loss.  He's down about 45 lbs total since starting it.    Denies GI pain, nausea. He has been cutting down on calories, portion sizes.  Trying to avoid candy.  Exercise - gets tired out really fast, his knees get sore after 60 feet.  Diet: Trying to avoid snacking. Said he's still having a hard time with diet, still having some carbs in diet with meals.  Starting Weight (prior to Wegovy): 445 lb (prior to diet changes).    Home Weight:  2/27 - 403 lb   5/23 - 396 lb  8/15 - 390 lb     Chronic pain:  -Oxycodone-acetaminophen  mg 1 tablets three times daily   -OxyContin ER 15 mg twice daily  MME: 90 MME  Previous medications:   MS Contin - not effective+ nausea  Morphine sulfate - not effective  Oxycodone-Acetaminophen - not effective   Belbuca - not as effective for pain.  Has been having tooth pain this week- Started clindamycin for this and will finish course tomorrow.   Noticed more pain when he is more active   Has been swimming for exercise, this helps his pain- wondering about knee injection for pain - he previously did this with ortho clinic Dasha thinks     ----------------    I spent 30 minutes with this patient today. All changes were made via collaborative practice agreement with Juan Martinez MD. A copy of the visit note was provided to the patient's provider(s).    A summary of these recommendations was declined by the patient.    Tarah Colmenares, Pharm.D.  Medication Therapy Management Pharmacist    Telemedicine Visit Details  Type of service:  Telephone visit  Start Time:  2:05 PM  End Time: 2:23 PM     Medication Therapy Recommendations  No medication therapy recommendations to display

## 2024-11-07 ENCOUNTER — VIRTUAL VISIT (OUTPATIENT)
Dept: PHARMACY | Facility: PHYSICIAN GROUP | Age: 64
End: 2024-11-07
Payer: COMMERCIAL

## 2024-11-07 DIAGNOSIS — E66.813 CLASS 3 SEVERE OBESITY DUE TO EXCESS CALORIES WITH SERIOUS COMORBIDITY AND BODY MASS INDEX (BMI) OF 50.0 TO 59.9 IN ADULT (H): Primary | ICD-10-CM

## 2024-11-07 DIAGNOSIS — E66.01 CLASS 3 SEVERE OBESITY DUE TO EXCESS CALORIES WITH SERIOUS COMORBIDITY AND BODY MASS INDEX (BMI) OF 50.0 TO 59.9 IN ADULT (H): Primary | ICD-10-CM

## 2024-11-07 PROCEDURE — 99607 MTMS BY PHARM ADDL 15 MIN: CPT | Mod: 93 | Performed by: PHARMACIST

## 2024-11-07 PROCEDURE — 99606 MTMS BY PHARM EST 15 MIN: CPT | Mod: 93 | Performed by: PHARMACIST

## 2024-11-07 NOTE — PROGRESS NOTES
Medication Therapy Management (MTM) Encounter    ASSESSMENT:                            Medication Adherence/Access: see below    Obesity:     With BMI >50 and hypertension, afib and heart failure. Would benefit from alternate GLP-1 that is more effective like Zepbound if we can get it covered- will start PA to minimize cardiovascular risk. Equivalent Mounjaro dose to Wegovy 2.4 mg is 5 mg- will increase dose given limited benefit with current Wegovy.    Despite lifestyle/health improvements with nutrition, exercise, and behavioral changes and Wegovy treatment for at least 15 months, the patient has been unable to achieve significant and sustainable weight loss.     Zepbound (tirzepatide) is an FDA-approved medication for chronic weight management in adults with a BMI of 30 or higher or a BMI of 27 or higher with weight-related comorbidity. The patient's BMI qualifies them for Zepbound, which has shown remarkable efficacy and a favorable safety profile. Patient has no history of pancreatitis. The patient has no personal or family history of medullary thyroid carcinoma or MEN2. Zepbound is the only medication in its class as a Glucagon-like peptide-1 (GLP-1) and glucose-dependent insulinotropic polypeptide (GIP) agonist being used for weight management.     The patient's unsuccessful weight management attempts and previous medication failures with Wegovy highlight the need for Zepbound as a medically necessary treatment option. Denying coverage would hinder the patient's progress and increase the risk of obesity-related health conditions.    PLAN:                            We will try to get Zepbound 7.5 mg covered for weight loss    Follow-up:4-5 weeks to check on Zepbound     SUBJECTIVE/OBJECTIVE:                          Stephan Montanez is a 64 year old male called for a follow-up visit. We were accompanied by his girlfriend Dasha     Reason for visit: MTM follow-up    Allergies/ADRs: Reviewed in chart  Past  "Medical History: Reviewed in chart  Tobacco: He reports that he has quit smoking. His smoking use included cigarettes. He has never used smokeless tobacco.  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     Obesity/BMI >50:     - Semaglutide (Wegovy) 2.4 mg weekly on Wednesdays- has 1 wegovy pen left  Patient states he \"hasn't been trying hard enough on his diet\"- He finds it hard to cut out food - not eating small enough portions and is eating 4-5 times per day. Feels like food is his vice and he enjoys it too much to stop.   Does feel like Wegovy helping to reduce how much he thinks about food, but maybe not working well enough anymore.   Denies GI pain, nausea.  Exercise - gets tired out really fast, his knees get sore after 60 feet. Previously used exercise as a stress management technique/liked to weight lift  Starting Weight (prior to Wegovy): 445 lb (prior to diet changes).    Home Weight:  2/27 - 403 lb   5/23 - 396 lb  8/15 - 390 lb   11/7-  400 lb     ----------------    I spent 13 minutes with this patient today. All changes were made via collaborative practice agreement with Juan Martinez MD. A copy of the visit note was provided to the patient's provider(s).    A summary of these recommendations was declined by the patient.    Tarah Colmenares, Pharm.D.  Medication Therapy Management Pharmacist    Telemedicine Visit Details  The patient's medications can be safely assessed via a telemedicine encounter.  Type of service:  Telephone visit  Originating Location (pt. Location): Home  Distant Location (provider location):  On-site  Start Time: 1:47 PM  End Time: 2:00 PM       Medication Therapy Recommendations  Class 3 severe obesity due to excess calories with serious comorbidity and body mass index (BMI) of 50.0 to 59.9 in " adult (H)   1 Current Medication: Semaglutide-Weight Management (WEGOVY) 2.4 MG/0.75ML pen (Discontinued)   Current Medication Sig: Inject 2.4 mg Subcutaneous once a week   Rationale: More effective medication available - Ineffective medication - Effectiveness   Recommendation: Change Medication - Zepbound 7.5 MG/0.5ML Soaj   Status: Accepted per CPA   Identified Date: 11/7/2024 Completed Date: 11/7/2024

## 2024-12-18 ENCOUNTER — VIRTUAL VISIT (OUTPATIENT)
Dept: PHARMACY | Facility: PHYSICIAN GROUP | Age: 64
End: 2024-12-18
Payer: COMMERCIAL

## 2024-12-18 DIAGNOSIS — E66.813 CLASS 3 SEVERE OBESITY DUE TO EXCESS CALORIES WITH SERIOUS COMORBIDITY AND BODY MASS INDEX (BMI) OF 50.0 TO 59.9 IN ADULT (H): Primary | ICD-10-CM

## 2024-12-18 DIAGNOSIS — E66.01 CLASS 3 SEVERE OBESITY DUE TO EXCESS CALORIES WITH SERIOUS COMORBIDITY AND BODY MASS INDEX (BMI) OF 50.0 TO 59.9 IN ADULT (H): Primary | ICD-10-CM

## 2024-12-18 PROCEDURE — 99606 MTMS BY PHARM EST 15 MIN: CPT | Mod: 93 | Performed by: PHARMACIST

## 2024-12-18 NOTE — PROGRESS NOTES
Medication Therapy Management (MTM) Encounter    ASSESSMENT:                            Medication Adherence/Access: see below    Obesity:     With BMI >50 and hypertension, afib and heart failure. Would benefit from alternate GLP-1 that is more effective like Zepbound if we can get it covered- will start PA to minimize cardiovascular risk. Equivalent Mounjaro dose to Wegovy 2.4 mg is 5 mg- will increase dose given limited benefit with current Wegovy.  See below for appeal letter     PLAN:                            We will try to get Zepbound 7.5 mg covered for weight loss with appeal letter. If they deny the appeal, we would have to try Saxenda first.     To Whom It May Concern,    I am writing on behalf of my patient, Stephan Montanez  to document the medical necessity of Zepbound for the treatment of Class III Obesity (BMI at least 40 kg/m2). This letter provides information about the patient's medical history and diagnosis and a statement summarizing my treatment rationale.     Summary of Patient History and Diagnosis  Stephan Montanez is a 64 year old male with a diagnosis of Class III Obesity (BMI at least 40 kg/m2).     Ht 69.5 inch  Wt 400 lb  BMI 58.2    Treatment Rationale  Despite lifestyle/health improvements with nutrition, exercise, and behavioral changes for at least 12 months, the patient has been unable to achieve significant and sustainable weight loss.     Previous attempts with the below medications were unsuccessful    Wegovy 2.4 mg x 6 months     Zepbound (tirzepatide) is an FDA-approved medication for chronic weight management in adults with a BMI of 30 or higher or a BMI of 27 or higher with weight-related comorbidity. The patient's BMI qualifies them for Zepbound, which has shown remarkable efficacy and a favorable safety profile. Patient has no history of pancreatitis. The patient has no personal or family history of medullary thyroid carcinoma or MEN2. Zepbound is the only medication in  "its class as a Glucagon-like peptide-1 (GLP-1) and glucose-dependent insulinotropic polypeptide (GIP) agonist being used for weight management. In the [employer] 5 trial, Zepbound was demonstrated superior to Wegovy for weight loss.     The patient's unsuccessful weight management attempts and previous medication failure with Wegovy highlight the need for Zepbound as a medically necessary treatment option. Using insurance suggested alternate Saxenda would not be as effective as Zepbound and would hinder the patient's progress and increase the risk of obesity-related health conditions.    I kindly request that you review Setphan's case and reconsider coverage for Zepbound as an integral part of their obesity treatment plan.     Duration  12 months     Summary  In summary, Zepbound is medically necessary for this patient s medical condition.  I look forward to receiving your timely response and approval of this request.     Follow-up: PCP 4 weeks in person, 8 weeks with Tarah to check on Zepbound     SUBJECTIVE/OBJECTIVE:                          Stephan Montanez is a 64 year old male called for a follow-up visit.     Reason for visit: MTM follow-up    Allergies/ADRs: Reviewed in chart  Past Medical History: Reviewed in chart  Tobacco: He reports that he has quit smoking. His smoking use included cigarettes. He has never used smokeless tobacco.  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     Obesity/BMI >50:     - Semaglutide (Wegovy) 2.4 mg weekly on Wednesdays-   Still has a box of wegovy at home - insurance denied initial Zepbound PA, states he must try and fail Saxenda and Wegovy   Patient states he \"hasn't been trying hard enough on his diet\"- He finds it hard to cut out food - not eating small enough portions and is eating " 4-5 times per day. Feels like food is his vice and he enjoys it too much to stop.   Does feel like Wegovy helping to reduce how much he thinks about food, but maybe not working well enough anymore.   Denies GI pain, nausea.  Exercise - gets tired out really fast, his knees get sore after 60 feet. Previously used exercise as a stress management technique/liked to weight lift  Starting Weight (prior to Wegovy): 445 lb (prior to diet changes).    Home Weight:  2/27 - 403 lb   5/23 - 396 lb  8/15 - 390 lb   11/7-  400 lb   12/18- 400 lb     ----------------    I spent 7 minutes with this patient today. All changes were made via collaborative practice agreement with Juan Martinez MD. A copy of the visit note was provided to the patient's provider(s).    A summary of these recommendations was declined by the patient.    Tarah Colmenares, Pharm.D.  Medication Therapy Management Pharmacist    Telemedicine Visit Details  The patient's medications can be safely assessed via a telemedicine encounter.  Type of service:  Telephone visit  Originating Location (pt. Location): Home  Distant Location (provider location):  On-site  Start Time: 1:02 PM  End Time: 1:09 PM       Medication Therapy Recommendations  No medication therapy recommendations to display

## 2025-02-12 ENCOUNTER — VIRTUAL VISIT (OUTPATIENT)
Dept: PHARMACY | Facility: PHYSICIAN GROUP | Age: 65
End: 2025-02-12
Payer: MEDICAID

## 2025-02-12 DIAGNOSIS — G89.29 OTHER CHRONIC PAIN: ICD-10-CM

## 2025-02-12 DIAGNOSIS — E66.01 CLASS 3 SEVERE OBESITY DUE TO EXCESS CALORIES WITH SERIOUS COMORBIDITY AND BODY MASS INDEX (BMI) OF 50.0 TO 59.9 IN ADULT (H): Primary | ICD-10-CM

## 2025-02-12 DIAGNOSIS — E66.813 CLASS 3 SEVERE OBESITY DUE TO EXCESS CALORIES WITH SERIOUS COMORBIDITY AND BODY MASS INDEX (BMI) OF 50.0 TO 59.9 IN ADULT (H): Primary | ICD-10-CM

## 2025-02-12 DIAGNOSIS — I48.20 CHRONIC ATRIAL FIBRILLATION (H): ICD-10-CM

## 2025-02-12 DIAGNOSIS — R60.9 EDEMA, UNSPECIFIED TYPE: ICD-10-CM

## 2025-02-12 PROCEDURE — 99607 MTMS BY PHARM ADDL 15 MIN: CPT | Mod: 93 | Performed by: PHARMACIST

## 2025-02-12 PROCEDURE — 99605 MTMS BY PHARM NP 15 MIN: CPT | Mod: 93 | Performed by: PHARMACIST

## 2025-02-12 NOTE — PROGRESS NOTES
Medication Therapy Management (MTM) Encounter    ASSESSMENT:                            Medication Adherence/Access: see below    Afib/Hypertension/Edema:   Stable.  INR goal 2-3.  His home blood pressure readings is below goal of <140/90.    Obesity:     With BMI >50 and hypertension, afib and heart failure. Would benefit from alternate GLP-1 that is more effective like Zepbound if we can get it covered- was approved by [employer] prior to lapse in coverage.  Going to need a new PA through medicaid, with one expected for March through [employer]- discussed with patient there may be a delay in processing, may have to restart Zepbound titration in March when [employer] is active again   See below for appeal letter     Chronic pain:  Stable on current regimen. Likely going to need a PA for Percocet, will mickie high priority if it is needed     PLAN:                            Call the state and ask for your Medicaid ID information     We will try to get Zepbound 7.5 mg covered for weight loss with appeal letter. If they deny the appeal, we would have to try Saxenda first.      To Whom It May Concern,     I am writing on behalf of my patient, Stephan Montanez  to document the medical necessity of Zepbound for the treatment of Class III Obesity (BMI at least 40 kg/m2). This letter provides information about the patient's medical history and diagnosis and a statement summarizing my treatment rationale.      Summary of Patient History and Diagnosis  Stephan Montanez is a 64 year old male with a diagnosis of Class III Obesity (BMI at least 40 kg/m2).      Ht 69.5 inch  Wt 400 lb  BMI 58.2     Treatment Rationale  Despite lifestyle/health improvements with nutrition, exercise, and behavioral changes for at least 12 months, the patient has been unable to achieve significant and sustainable weight loss.      Previous attempts with the below medications were unsuccessful    Wegovy 2.4 mg x 6 months      Zepbound (tirzepatide) is an  FDA-approved medication for chronic weight management in adults with a BMI of 30 or higher or a BMI of 27 or higher with weight-related comorbidity. The patient's BMI qualifies them for Zepbound, which has shown remarkable efficacy and a favorable safety profile. Patient has no history of pancreatitis. The patient has no personal or family history of medullary thyroid carcinoma or MEN2. Zepbound is the only medication in its class as a Glucagon-like peptide-1 (GLP-1) and glucose-dependent insulinotropic polypeptide (GIP) agonist being used for weight management. In the [facility] 5 trial, Zepbound was demonstrated superior to Wegovy for weight loss.      The patient's unsuccessful weight management attempts and previous medication failure with Wegovy highlight the need for Zepbound as a medically necessary treatment option. Using insurance suggested alternate Saxenda would not be as effective as Zepbound and would hinder the patient's progress and increase the risk of obesity-related health conditions.     I kindly request that you review Stephan's case and reconsider coverage for Zepbound as an integral part of their obesity treatment plan.      Duration  12 months      Summary  In summary, Zepbound is medically necessary for this patient s medical condition.  I look forward to receiving your timely response and approval of this request.     Follow-up:   3/3 1 pm       SUBJECTIVE/OBJECTIVE:                          Stephan Montanez is a 64 year old male called for a follow-up visit. We were accompanied by his girlfriend Dasha     Reason for visit: MTM follow-up     Allergies/ADRs: Reviewed in chart  Past Medical History: Reviewed in chart  Tobacco: He reports that he has quit smoking. His smoking use included cigarettes. He has never used smokeless tobacco.  He also smokes marijuana (non-medical)   Alcohol: none     Medication Adherence/Access: no issues reported  His girlfriend helps with medications.     Medicaid  "through the state for February March 1st [employer]     Afib/Hypertension/Edema:   - Warfarin 2.5 mg daily: 2 tabs MWF, 3 tablets all other days.  - Metoprolol  mg daily  - Torsemide 20 mg twice daily as needed (doesn't use every day, uses every few weeks).  - Lisinopril 10 mg daily  Patient reports no current concerns of bruising or bleeding. Patient does not have a history of GI bleed. denies chest pain and dyspnea  Home blood pressure- 130-140's/80-92's  Checks INR weekly,followed by nurse protocol    Obesity:     - Zepbound 7.5mg weekly   Zepbound appeal did get approved by [employer] Wright prior to his coverage lapsing,   About to use his last shot of Wegovy this week  Patient states he \"hasn't been trying hard enough on his diet\"- He finds it hard to cut out food - not eating small enough portions and is eating 4-5 times per day.   Denies GI pain, nausea. He has been cutting down on calories, portion sizes.  Trying to avoid candy.  Exercise - gets tired out really fast, his knees get sore after 60 feet.  Diet: Trying to avoid snacking. Said he's still having a hard time with diet, still having some carbs in diet with meals.  Starting Weight (prior to Wegovy): 445 lb (prior to diet changes).    Home Weight:  2/27 - 403 lb   5/23 - 396 lb  8/15 - 390 lb   11/7-  400 lb   12/18- 400 lb     Chronic pain:  -Oxycodone-acetaminophen  mg 1 tablets three times daily   -OxyContin ER 15 mg twice daily  MME: 90 MME- this regimen is effective for pain. Worried about insurance requirements when he needs refills. States he usually gets paper Rx printed   Previous medications:   MS Contin - not effective+ nausea  Morphine sulfate - not effective  Oxycodone-Acetaminophen - not effective   Belbuca - not as effective for pain.  Has been having tooth pain this week- Started clindamycin for this and will finish course tomorrow.   Noticed more pain when he is more active   Has been swimming for exercise, this helps his pain- " wondering about knee injection for pain - he previously did this with ortho clinic [facility]     ----------------    I spent 13 minutes with this patient today. All changes were made via collaborative practice agreement with Juan Martinez MD.    A summary of these recommendations was declined by the patient.    Tarah Colmenares, Pharm.D.  Medication Therapy Management Pharmacist    Telemedicine Visit Details  Type of service:  Telephone visit  Start Time:  1:07 PM  End Time: 1:21 PM     Medication Therapy Recommendations  Class 3 severe obesity due to excess calories with serious comorbidity and body mass index (BMI) of 50.0 to 59.9 in adult (H)   1 Current Medication: Tirzepatide 7.5 MG/0.5ML SOAJ   Current Medication Sig: Inject 7.5 mg subcutaneously every 7 days.   Rationale: Medication product not available - Adherence - Adherence   Recommendation: Provide Adherence Intervention   Status: Resolved Med Access Issue   Identified Date: 2/12/2025 Completed Date: 2/12/2025

## 2025-03-03 ENCOUNTER — VIRTUAL VISIT (OUTPATIENT)
Dept: PHARMACY | Facility: PHYSICIAN GROUP | Age: 65
End: 2025-03-03
Payer: MEDICAID

## 2025-03-03 DIAGNOSIS — E66.813 CLASS 3 SEVERE OBESITY DUE TO EXCESS CALORIES WITH SERIOUS COMORBIDITY AND BODY MASS INDEX (BMI) OF 50.0 TO 59.9 IN ADULT (H): Primary | ICD-10-CM

## 2025-03-03 DIAGNOSIS — G89.29 OTHER CHRONIC PAIN: ICD-10-CM

## 2025-03-03 DIAGNOSIS — R04.2 COUGHING UP BLOOD: ICD-10-CM

## 2025-03-03 DIAGNOSIS — E66.01 CLASS 3 SEVERE OBESITY DUE TO EXCESS CALORIES WITH SERIOUS COMORBIDITY AND BODY MASS INDEX (BMI) OF 50.0 TO 59.9 IN ADULT (H): Primary | ICD-10-CM

## 2025-03-03 PROCEDURE — 99606 MTMS BY PHARM EST 15 MIN: CPT | Mod: 93 | Performed by: PHARMACIST

## 2025-03-03 PROCEDURE — 99607 MTMS BY PHARM ADDL 15 MIN: CPT | Mod: 93 | Performed by: PHARMACIST

## 2025-03-03 NOTE — PROGRESS NOTES
Medication Therapy Management (MTM) Encounter    ASSESSMENT:                            Medication Adherence/Access: see below    Coughing up blood  Sent to clinic RN to triage symptoms, advise if further level of care is needed.     Afib/Hypertension/Edema:   Stable.  INR goal 2-3.  His home blood pressure readings is below goal of <140/90.    Obesity:     With BMI >50 and hypertension, afib and heart failure. Would benefit from alternate GLP-1/GIP agonist and titrating. Will plan to start PA through new insurance now to prevent gaps in care.     Chronic pain:  Stable on current regimen.     PLAN:                            Expect a call from clinic RN  In 3 weeks, increase Zepbound to 10 mg weekly    Follow-up: In May with Tarah, April with Dr Martinez    SUBJECTIVE/OBJECTIVE:                          Stephan Montanez is a 64 year old male called for a follow-up visit.     Reason for visit: MTM follow-up     Allergies/ADRs: Reviewed in chart  Past Medical History: Reviewed in chart  Tobacco: He reports that he has quit smoking. His smoking use included cigarettes. He has never used smokeless tobacco.  He also smokes marijuana (non-medical) - has decided to stop smoking.   Alcohol: none     Medication Adherence/Access: no issues reported  His girlfriend helps with medications.     March 1st started [employer] coverage    Coughing up blood  Patient states he has had 3 days of coughing up blood. Has chronic cough that he feels like is from his belly, but blood in cough is new. Everyone in the household has allergy type cough, he is the only one coughing up blood. Has been getting lighter over the weekend, was originally much darker than it is now.    No blood in nose, feels as though it is coming from his stomach.   Has made the decision to stop smoking marijuana, knows this is bad for the cough    Afib/Hypertension/Edema:   - Warfarin 2.5 mg daily: 2 tabs MWF, 3 tablets all other days.  - Metoprolol  mg daily  -  Torsemide 20 mg twice daily as needed (doesn't use every day, uses every few weeks).  - Lisinopril 10 mg daily  Patient reports concerns of bruising or bleeding in stomach- no other symptoms.denies chest pain and dyspnea  Home blood pressure- 130-140's/80-92's  Checks INR weekly,followed by nurse protocol    Obesity:     - Zepbound 7.5mg weekly on Wednesdays  Likes new Zepbound, no side effects noted. Says he is working harder on his diet on new med.    Denies GI pain, nausea. He has been cutting down on calories, portion sizes.  Trying to avoid candy.  Exercise - gets tired out really fast, his knees get sore after 60 feet.  Diet: Trying to avoid snacking. Said he's still having a hard time with diet, still having some carbs in diet with meals.  Starting Weight (prior to Wegovy): 445 lb (prior to diet changes).    Home Weight:  2/27 - 403 lb   5/23 - 396 lb  8/15 - 390 lb   11/7-  400 lb   12/18- 400 lb     Chronic pain:  -Oxycodone-acetaminophen  mg 1 tablets three times daily   -OxyContin ER 15 mg twice daily  MME: 90 MME- this regimen is effective for pain.   Paid for last refill for cash. Appears all PA issues are resolved for next month's refill.     ----------------    I spent 13 minutes with this patient today. All changes were made via collaborative practice agreement with Juan Martinez MD.    A summary of these recommendations was declined by the patient.    Tarah Colmenares, Pharm.D.  Medication Therapy Management Pharmacist    Telemedicine Visit Details  Type of service:  Telephone visit  Start Time:  1:07 PM  End Time: 1:21 PM     Medication Therapy Recommendations  Class 3 severe obesity due to excess calories with serious comorbidity and body mass index (BMI) of 50.0 to 59.9 in adult (H)   1 Current Medication: Tirzepatide 7.5 MG/0.5ML SOAJ   Current Medication Sig: Inject 7.5 mg subcutaneously every 7 days.   Rationale: Dose too low - Dosage too low - Effectiveness   Recommendation: Increase Dose    Status: Accepted per CPA   Identified Date: 3/3/2025 Completed Date: 3/4/2025